# Patient Record
Sex: MALE | Race: BLACK OR AFRICAN AMERICAN | Employment: OTHER | URBAN - METROPOLITAN AREA
[De-identification: names, ages, dates, MRNs, and addresses within clinical notes are randomized per-mention and may not be internally consistent; named-entity substitution may affect disease eponyms.]

---

## 2018-02-08 ENCOUNTER — OP HISTORICAL/CONVERTED ENCOUNTER (OUTPATIENT)
Dept: OTHER | Age: 73
End: 2018-02-08

## 2018-02-15 ENCOUNTER — OP HISTORICAL/CONVERTED ENCOUNTER (OUTPATIENT)
Dept: OTHER | Age: 73
End: 2018-02-15

## 2018-10-12 ENCOUNTER — HOSPITAL ENCOUNTER (OUTPATIENT)
Dept: CT IMAGING | Age: 73
Discharge: HOME | End: 2018-10-12
Payer: MEDICARE

## 2018-10-12 DIAGNOSIS — R91.8 HILAR MASS: ICD-10-CM

## 2018-10-12 PROCEDURE — 71260 CT THORAX DX C+: CPT

## 2018-10-12 RX ORDER — SODIUM CHLORIDE 0.9 % (FLUSH) 0.9 %
10 SYRINGE (ML) INJECTION
Status: COMPLETED | OUTPATIENT
Start: 2018-10-12 | End: 2018-10-12

## 2018-10-12 RX ADMIN — Medication 10 ML: at 10:47

## 2018-10-22 ENCOUNTER — APPOINTMENT (OUTPATIENT)
Dept: GENERAL RADIOLOGY | Age: 73
End: 2018-10-22
Attending: EMERGENCY MEDICINE
Payer: MEDICARE

## 2018-10-22 ENCOUNTER — APPOINTMENT (OUTPATIENT)
Dept: CT IMAGING | Age: 73
End: 2018-10-22
Attending: EMERGENCY MEDICINE
Payer: MEDICARE

## 2018-10-22 ENCOUNTER — HOSPITAL ENCOUNTER (EMERGENCY)
Age: 73
Discharge: HOME | End: 2018-10-22
Attending: EMERGENCY MEDICINE
Payer: MEDICARE

## 2018-10-22 VITALS
HEIGHT: 65 IN | SYSTOLIC BLOOD PRESSURE: 149 MMHG | RESPIRATION RATE: 18 BRPM | TEMPERATURE: 97.4 F | HEART RATE: 73 BPM | DIASTOLIC BLOOD PRESSURE: 85 MMHG | OXYGEN SATURATION: 99 % | WEIGHT: 203.93 LBS | BODY MASS INDEX: 33.98 KG/M2

## 2018-10-22 DIAGNOSIS — R20.2 NUMBNESS AND TINGLING OF RIGHT ARM: ICD-10-CM

## 2018-10-22 DIAGNOSIS — R07.9 CHEST PAIN, UNSPECIFIED TYPE: Primary | ICD-10-CM

## 2018-10-22 DIAGNOSIS — R20.0 NUMBNESS AND TINGLING OF RIGHT ARM: ICD-10-CM

## 2018-10-22 LAB
ALBUMIN SERPL-MCNC: 3.5 G/DL (ref 3.5–5)
ALBUMIN/GLOB SERPL: 1 {RATIO} (ref 1.1–2.2)
ALP SERPL-CCNC: 96 U/L (ref 45–117)
ALT SERPL-CCNC: 24 U/L (ref 12–78)
ANION GAP SERPL CALC-SCNC: 8 MMOL/L (ref 5–15)
APTT PPP: 29.6 SEC (ref 22.1–32)
AST SERPL-CCNC: 21 U/L (ref 15–37)
BASOPHILS # BLD: 0 K/UL (ref 0–0.1)
BASOPHILS NFR BLD: 0 % (ref 0–1)
BILIRUB SERPL-MCNC: 0.3 MG/DL (ref 0.2–1)
BUN SERPL-MCNC: 10 MG/DL (ref 6–20)
BUN/CREAT SERPL: 8 (ref 12–20)
CALCIUM SERPL-MCNC: 8.7 MG/DL (ref 8.5–10.1)
CHLORIDE SERPL-SCNC: 105 MMOL/L (ref 97–108)
CO2 SERPL-SCNC: 27 MMOL/L (ref 21–32)
COMMENT, HOLDF: NORMAL
CREAT SERPL-MCNC: 1.27 MG/DL (ref 0.7–1.3)
D DIMER PPP FEU-MCNC: 0.31 MG/L FEU (ref 0–0.65)
DIFFERENTIAL METHOD BLD: ABNORMAL
EOSINOPHIL # BLD: 0.2 K/UL (ref 0–0.4)
EOSINOPHIL NFR BLD: 2 % (ref 0–7)
ERYTHROCYTE [DISTWIDTH] IN BLOOD BY AUTOMATED COUNT: 16.6 % (ref 11.5–14.5)
GLOBULIN SER CALC-MCNC: 3.6 G/DL (ref 2–4)
GLUCOSE BLD STRIP.AUTO-MCNC: 100 MG/DL (ref 65–100)
GLUCOSE SERPL-MCNC: 102 MG/DL (ref 65–100)
HCT VFR BLD AUTO: 36.9 % (ref 36.6–50.3)
HGB BLD-MCNC: 11.7 G/DL (ref 12.1–17)
IMM GRANULOCYTES # BLD: 0 K/UL (ref 0–0.04)
IMM GRANULOCYTES NFR BLD AUTO: 0 % (ref 0–0.5)
INR PPP: 1 (ref 0.9–1.1)
LIPASE SERPL-CCNC: 223 U/L (ref 73–393)
LYMPHOCYTES # BLD: 2.8 K/UL (ref 0.8–3.5)
LYMPHOCYTES NFR BLD: 28 % (ref 12–49)
MCH RBC QN AUTO: 22.5 PG (ref 26–34)
MCHC RBC AUTO-ENTMCNC: 31.7 G/DL (ref 30–36.5)
MCV RBC AUTO: 70.8 FL (ref 80–99)
MONOCYTES # BLD: 1.1 K/UL (ref 0–1)
MONOCYTES NFR BLD: 11 % (ref 5–13)
NEUTS SEG # BLD: 5.9 K/UL (ref 1.8–8)
NEUTS SEG NFR BLD: 59 % (ref 32–75)
NRBC # BLD: 0 K/UL (ref 0–0.01)
NRBC BLD-RTO: 0 PER 100 WBC
PLATELET # BLD AUTO: 250 K/UL (ref 150–400)
PMV BLD AUTO: 9.3 FL (ref 8.9–12.9)
POTASSIUM SERPL-SCNC: 3.7 MMOL/L (ref 3.5–5.1)
PROT SERPL-MCNC: 7.1 G/DL (ref 6.4–8.2)
PROTHROMBIN TIME: 10 SEC (ref 9–11.1)
RBC # BLD AUTO: 5.21 M/UL (ref 4.1–5.7)
SAMPLES BEING HELD,HOLD: NORMAL
SERVICE CMNT-IMP: NORMAL
SODIUM SERPL-SCNC: 140 MMOL/L (ref 136–145)
THERAPEUTIC RANGE,PTTT: NORMAL SECS (ref 58–77)
TROPONIN I SERPL-MCNC: <0.05 NG/ML
WBC # BLD AUTO: 10 K/UL (ref 4.1–11.1)

## 2018-10-22 PROCEDURE — 93005 ELECTROCARDIOGRAM TRACING: CPT

## 2018-10-22 PROCEDURE — 85610 PROTHROMBIN TIME: CPT | Performed by: EMERGENCY MEDICINE

## 2018-10-22 PROCEDURE — 82962 GLUCOSE BLOOD TEST: CPT

## 2018-10-22 PROCEDURE — 36415 COLL VENOUS BLD VENIPUNCTURE: CPT | Performed by: EMERGENCY MEDICINE

## 2018-10-22 PROCEDURE — 85025 COMPLETE CBC W/AUTO DIFF WBC: CPT | Performed by: EMERGENCY MEDICINE

## 2018-10-22 PROCEDURE — 85379 FIBRIN DEGRADATION QUANT: CPT | Performed by: EMERGENCY MEDICINE

## 2018-10-22 PROCEDURE — 85730 THROMBOPLASTIN TIME PARTIAL: CPT | Performed by: EMERGENCY MEDICINE

## 2018-10-22 PROCEDURE — 84484 ASSAY OF TROPONIN QUANT: CPT | Performed by: EMERGENCY MEDICINE

## 2018-10-22 PROCEDURE — 80053 COMPREHEN METABOLIC PANEL: CPT | Performed by: EMERGENCY MEDICINE

## 2018-10-22 PROCEDURE — 83690 ASSAY OF LIPASE: CPT | Performed by: EMERGENCY MEDICINE

## 2018-10-22 PROCEDURE — 70450 CT HEAD/BRAIN W/O DYE: CPT

## 2018-10-22 PROCEDURE — 99284 EMERGENCY DEPT VISIT MOD MDM: CPT

## 2018-10-22 PROCEDURE — 71046 X-RAY EXAM CHEST 2 VIEWS: CPT

## 2018-10-23 LAB
ATRIAL RATE: 70 BPM
CALCULATED P AXIS, ECG09: 62 DEGREES
CALCULATED R AXIS, ECG10: 64 DEGREES
CALCULATED T AXIS, ECG11: 36 DEGREES
DIAGNOSIS, 93000: NORMAL
P-R INTERVAL, ECG05: 192 MS
Q-T INTERVAL, ECG07: 398 MS
QRS DURATION, ECG06: 80 MS
QTC CALCULATION (BEZET), ECG08: 429 MS
VENTRICULAR RATE, ECG03: 70 BPM

## 2018-10-23 NOTE — ED TRIAGE NOTES
Patient c/o RIGHT arm tingling from elbow to fingertips with pain, around 1pm.  Around 8pm tonight at home felt some chest tightness with bilateral arm tightness. I kept feeling lethargic and tired, dizzy all day.

## 2018-10-23 NOTE — DISCHARGE INSTRUCTIONS

## 2018-10-23 NOTE — ED NOTES
Pt evaluated by Dr Franky Muhammad at [facility], informed to cancel Code S process but to continue with neuro evaluation.

## 2018-10-23 NOTE — ED PROVIDER NOTES
81 y.o. male with no significant past medical history who presents from home via private vehicle with chief complaint of numbness. Pt reports onset earlier today of R arm numbness. Pt states numbness resolved, but reports onset \"2000\" of numbness to bilateral extremities accompanied by dizziness, non radiating mid sternal chest pain, and SOB. Pt reports symptoms lasted \"10 minutes\" and that he currently feels lightheaded. Pt reports recent history of similar symptoms. Pt denies any vision changes, loss of appetite, or trouble swallowing. There are no other acute medical concerns at this time. PCP: Aleja David MD 
 
Note written by Bradley Steward, as dictated by Mimi Martinez MD 9:10 PM 
 
 
 
The history is provided by the patient. No  was used. No past medical history on file. No past surgical history on file. No family history on file. Social History Socioeconomic History  Marital status:  Spouse name: Not on file  Number of children: Not on file  Years of education: Not on file  Highest education level: Not on file Social Needs  Financial resource strain: Not on file  Food insecurity - worry: Not on file  Food insecurity - inability: Not on file  Transportation needs - medical: Not on file  Transportation needs - non-medical: Not on file Occupational History  Not on file Tobacco Use  Smoking status: Not on file Substance and Sexual Activity  Alcohol use: Not on file  Drug use: Not on file  Sexual activity: Not on file Other Topics Concern  Not on file Social History Narrative  Not on file ALLERGIES: Pcn [penicillins] Review of Systems Constitutional: Negative for appetite change, chills and fever. HENT: Negative for trouble swallowing. Eyes: Negative for visual disturbance. Respiratory: Negative for shortness of breath (Resolved). Cardiovascular: Negative for chest pain (Resolved). Gastrointestinal: Negative for diarrhea, nausea and vomiting. Neurological: Positive for light-headedness. Negative for dizziness (Resolved) and numbness (Resolved). All other systems reviewed and are negative. Vitals:  
 10/22/18 2116 10/22/18 2249 BP: 149/85 Pulse: 73 Resp: 18 Temp: 97.4 °F (36.3 °C) SpO2: 97% 99% Weight: 92.5 kg (203 lb 14.8 oz) Height: 5' 5\" (1.651 m) Physical Exam  
Constitutional: He is oriented to person, place, and time. He appears well-developed and well-nourished. No distress. NAD, AxOx4, speaking in complete sentences, GCS = 15 HENT:  
Head: Normocephalic and atraumatic. Right Ear: External ear normal.  
Left Ear: External ear normal.  
Nose: Nose normal.  
Mouth/Throat: Oropharynx is clear and moist. No oropharyngeal exudate. Cn intact No facial droop/ slurred speech/ tongue deviation Eyes: Conjunctivae and EOM are normal. Pupils are equal, round, and reactive to light. Right eye exhibits no discharge. Left eye exhibits no discharge. No scleral icterus. Neck: Normal range of motion. Neck supple. No JVD present. No tracheal deviation present. No thyromegaly present. Cardiovascular: Normal rate, regular rhythm, normal heart sounds and intact distal pulses. Exam reveals no gallop and no friction rub. No murmur heard. Pulmonary/Chest: Effort normal and breath sounds normal. No respiratory distress. He has no wheezes. He has no rales. He exhibits no tenderness. Abdominal: Soft. Bowel sounds are normal. He exhibits no distension and no mass. There is no tenderness. There is no rebound and no guarding. nttp Genitourinary:  
Genitourinary Comments: Pt denies urinary/ Testicular/ scrotal or penile  complaints Musculoskeletal: Normal range of motion. He exhibits no edema. Lymphadenopathy:  
  He has no cervical adenopathy. Neurological: He is alert and oriented to person, place, and time. No cranial nerve deficit. Coordination normal.  
pt has motor/ CV/ Sensation grossly intact to all extremities, R = L in strength; 
 
 R = L; ambulating w/ ease Skin: Skin is warm and dry. No rash noted. No erythema. Psychiatric: He has a normal mood and affect. Nursing note and vitals reviewed. MDM Procedures Chief Complaint Patient presents with  Numbness 10:04 PM 
The patients presenting problems have been discussed, and they are in agreement with the care plan formulated and outlined with them. I have encouraged them to ask questions as they arise throughout their visit. MEDICATIONS GIVEN: 
Medications - No data to display LABS REVIEWED: 
Labs Reviewed CBC WITH AUTOMATED DIFF PROTHROMBIN TIME + INR  
PTT  
SAMPLES BEING HELD METABOLIC PANEL, COMPREHENSIVE  
TROPONIN I  
D DIMER  
LIPASE  
GLUCOSE, POC  
 
 
RADIOLOGY RESULTS: 
The following have been ordered and reviewed: 
_____________________________________________________________________ 
_____________________________________________________________________ EKG interpretation:  
Rhythm: normal sinus rhythm; and ir-regular due to a PVC  . Rate (approx.): 70; Axis: normal; P wave: normal; QRS interval: normal ; ST/T wave: normal; Negative acute significant segmental elevations/ no old study;  
 
PROCEDURES: 
 
 
 
CONSULTATIONS:  
 
 
PROGRESS NOTES: 
 
DIAGNOSIS: 
 
1. Chest pain, unspecified type 2. Numbness and tingling of right arm PLAN: 
1- Chest Pain / R arm numbness - neg ed evaluation - will have pt follow-up with Cardiology;  
 
 
ED COURSE: The patients hospital course has been uncomplicated. PROGRESS NOTE: 
9:11 PM 
Discussed possible etiology/ Favor cardiac > TIA; Pt agrees; Code S cancelled.   
 
10:05 PM 
'feeling better'; awaiting results;  
  
11:13 PM 
 Jose Roberto Lema's  results have been reviewed with him. He has been counseled regarding his diagnosis. He verbally conveys understanding and agreement of the signs, symptoms, diagnosis, treatment and prognosis and additionally agrees to Call/ Arrange follow up as recommended [facility] - 48 hours. He also agrees with the care-plan and conveys that all of his questions have been answered. I have also put together some discharge instructions for him that include: 1) educational information regarding their diagnosis, 2) how to care for their diagnosis at home, as well a 3) list of reasons why they would want to return to the ED prior to their follow-up appointment, should their condition change or for concerns.

## 2018-11-05 ENCOUNTER — HOSPITAL ENCOUNTER (OUTPATIENT)
Dept: MRI IMAGING | Age: 73
Discharge: HOME | End: 2018-11-05
Attending: FAMILY MEDICINE
Payer: MEDICARE

## 2018-11-05 DIAGNOSIS — R16.0 LIVER MASSES: ICD-10-CM

## 2018-11-05 PROCEDURE — 74183 MRI ABD W/O CNTR FLWD CNTR: CPT

## 2018-11-05 PROCEDURE — 74011250636 HC RX REV CODE- 250/636: Performed by: RADIOLOGY

## 2018-11-05 PROCEDURE — A9585 GADOBUTROL INJECTION: HCPCS | Performed by: RADIOLOGY

## 2018-11-05 RX ADMIN — GADOBUTROL 10 ML: 604.72 INJECTION INTRAVENOUS at 19:26

## 2018-11-13 ENCOUNTER — OFFICE VISIT (OUTPATIENT)
Dept: CARDIOLOGY CLINIC | Age: 73
End: 2018-11-13

## 2018-11-13 VITALS
WEIGHT: 208 LBS | HEART RATE: 84 BPM | OXYGEN SATURATION: 98 % | HEIGHT: 65 IN | BODY MASS INDEX: 34.66 KG/M2 | RESPIRATION RATE: 12 BRPM | DIASTOLIC BLOOD PRESSURE: 78 MMHG | SYSTOLIC BLOOD PRESSURE: 130 MMHG

## 2018-11-13 DIAGNOSIS — E78.2 MIXED HYPERLIPIDEMIA: ICD-10-CM

## 2018-11-13 DIAGNOSIS — R07.89 OTHER CHEST PAIN: Primary | ICD-10-CM

## 2018-11-13 DIAGNOSIS — I49.3 PVC'S (PREMATURE VENTRICULAR CONTRACTIONS): ICD-10-CM

## 2018-11-13 DIAGNOSIS — R06.02 SOB (SHORTNESS OF BREATH): ICD-10-CM

## 2018-11-13 RX ORDER — TRAZODONE HYDROCHLORIDE 100 MG/1
100 TABLET ORAL
COMMUNITY

## 2018-11-13 RX ORDER — OMEPRAZOLE 10 MG/1
10 CAPSULE, DELAYED RELEASE ORAL DAILY
COMMUNITY

## 2018-11-13 RX ORDER — CETIRIZINE HCL 10 MG
TABLET ORAL
COMMUNITY

## 2018-11-13 NOTE — PROGRESS NOTES
HISTORY OF PRESENT ILLNESS Genesis Ni is a 72 y.o. male SUMMARY:  
Problem List  Date Reviewed: 11/13/2018 None Current Outpatient Medications on File Prior to Visit Medication Sig  
 traZODone (DESYREL) 100 mg tablet Take 100 mg by mouth nightly.  omeprazole (PRILOSEC) 10 mg capsule Take 10 mg by mouth daily.  cetirizine (ZYRTEC) 10 mg tablet Take  by mouth. No current facility-administered medications on file prior to visit. CARDIOLOGY STUDIES TO DATE: 
none Chief Complaint Patient presents with  Chest Pain HPI : 
Mr. Sami Baeza is a 72year-old referred by the [facility] ER for cardiac evaluation. He has had a couple of episodes over the last few years with lower chest and epigastric discomfort with somewhat pleuritic quality to it, lightheadedness, transient right arm pain lasting five minutes or so without any other associated symptoms. He had a bad episode in late October and ended up in the emergency room at [facility]. His laboratory evaluation was negative for cardiac issues. His EKG showed sinus rhythm with PVCs. Up until about a month ago, he had been exercising regularly on a treadmill for about 30 minutes plus doing light weights without any symptoms suggestive of angina or heart failure. He has mild dyspnea on exertion, which he has had for years, sometimes with stairs. There is no history of hypertension or diabetes. Family history is negative for premature coronary disease. He has an elevated cholesterol, but is intolerant of statin drugs and quit smoking in 1975. Some time in his late 46s, he had a cardiac catheterization at [facility] and was told he had no blockages. He does not know why he had that heart catheterization at that time. He has some sleep difficulties from time to time and apparently he has a thyroid problem, maybe some nodules and is in the process of being evaluated for that.    
 
 
 
CARDIAC ROS:  
 negative for palpitations, syncope, orthopnea, paroxysmal nocturnal dyspnea, exertional chest pressure/discomfort, claudication, lower extremity edema Family History Problem Relation Age of Onset  Cancer Mother  Cancer Father  Heart Disease Neg Hx Past Medical History:  
Diagnosis Date  Back pain Lumbar  Liver lesion GENERAL ROS: 
A comprehensive review of systems was negative except for that written in the HPI. Visit Vitals /78 (BP 1 Location: Left arm, BP Patient Position: Sitting) Pulse 84 Resp 12 Ht 5' 5\" (1.651 m) Wt 208 lb (94.3 kg) SpO2 98% BMI 34.61 kg/m² Wt Readings from Last 3 Encounters:  
11/13/18 208 lb (94.3 kg) 10/22/18 203 lb 14.8 oz (92.5 kg) BP Readings from Last 3 Encounters:  
11/13/18 130/78  
10/22/18 149/85 PHYSICAL EXAM 
General appearance: alert, cooperative, no distress, appears stated age Neurologic: Alert and oriented X 3 Neck: supple, symmetrical, trachea midline, no adenopathy, no carotid bruit and no JVD Lungs: clear to auscultation bilaterally Heart: regular rate and rhythm, S1, S2 normal, no murmur, click, rub or gallop Abdomen: soft, non-tender. Bowel sounds normal. No masses,  no organomegaly Extremities: extremities normal, atraumatic, no cyanosis or edema Pulses: 2+ and symmetric ASSESSMENT Mr. Mickie Harry symptoms are somewhat atypical, but he does have important risk factors and he wants to continue to exercise, so for all these reasons I think he needs a stress echocardiogram and we will make arrangements to get that done in the near future. We talked at length about symptoms that would prompt an urgent return visit here or a call to 911. current treatment plan is effective, no change in therapy 
lab results and schedule of future lab studies reviewed with patient 
reviewed diet, exercise and weight control Encounter Diagnoses Name Primary?  Other chest pain Yes  PVC's (premature ventricular contractions)  SOB (shortness of breath)  Mixed hyperlipidemia Orders Placed This Encounter  ECHO TTE STRESS COMP W OR WO CONTR  
 traZODone (DESYREL) 100 mg tablet  omeprazole (PRILOSEC) 10 mg capsule  cetirizine (ZYRTEC) 10 mg tablet Follow-up Disposition: 
Return in about 4 weeks (around 12/11/2018). Sam Mtz MD11/13/2018

## 2018-11-13 NOTE — PROGRESS NOTES
1. Have you been to the ER, urgent care clinic since your last visit? Hospitalized since your last visit? Yes When: 10/22/2018 Where: [facility] Reason for visit: Chest pain, Numbness 2. Have you seen or consulted any other health care providers outside of the [address] [place] [address] since your last visit? Include any pap smears or colon screening. No  
 
Pt reports Med Rec. Completed. Pt reports taking Multivitamin Visit Vitals /78 (BP 1 Location: Left arm, BP Patient Position: Sitting) Pulse 84 Resp 12 Ht 5' 5\" (1.651 m) Wt 208 lb (94.3 kg) SpO2 98% BMI 34.61 kg/m²

## 2018-11-15 ENCOUNTER — HOSPITAL ENCOUNTER (OUTPATIENT)
Dept: ULTRASOUND IMAGING | Age: 73
Discharge: HOME | End: 2018-11-15
Attending: OTOLARYNGOLOGY
Payer: MEDICARE

## 2018-11-15 DIAGNOSIS — E04.1 NONTOXIC SINGLE THYROID NODULE: ICD-10-CM

## 2018-11-15 PROCEDURE — 76536 US EXAM OF HEAD AND NECK: CPT

## 2018-11-16 ENCOUNTER — CLINICAL SUPPORT (OUTPATIENT)
Dept: CARDIOLOGY CLINIC | Age: 73
End: 2018-11-16

## 2018-11-16 DIAGNOSIS — R07.89 OTHER CHEST PAIN: ICD-10-CM

## 2018-12-03 ENCOUNTER — HOSPITAL ENCOUNTER (OUTPATIENT)
Dept: CT IMAGING | Age: 73
Discharge: HOME | End: 2018-12-03
Attending: INTERNAL MEDICINE
Payer: MEDICARE

## 2018-12-03 ENCOUNTER — HOSPITAL ENCOUNTER (OUTPATIENT)
Dept: CT IMAGING | Age: 73
Discharge: HOME | End: 2018-12-03
Attending: RADIOLOGY
Payer: MEDICARE

## 2018-12-03 VITALS
OXYGEN SATURATION: 98 % | WEIGHT: 198 LBS | HEIGHT: 64 IN | SYSTOLIC BLOOD PRESSURE: 120 MMHG | DIASTOLIC BLOOD PRESSURE: 60 MMHG | BODY MASS INDEX: 33.8 KG/M2 | TEMPERATURE: 98.6 F | RESPIRATION RATE: 20 BRPM | HEART RATE: 76 BPM

## 2018-12-03 DIAGNOSIS — R16.0 HEPATOMEGALY: ICD-10-CM

## 2018-12-03 PROCEDURE — 88341 IMHCHEM/IMCYTCHM EA ADD ANTB: CPT

## 2018-12-03 PROCEDURE — 77030018781

## 2018-12-03 PROCEDURE — 88333 PATH CONSLTJ SURG CYTO XM 1: CPT

## 2018-12-03 PROCEDURE — 77030020268 HC MISC GENERAL SUPPLY

## 2018-12-03 PROCEDURE — 74150 CT ABDOMEN W/O CONTRAST: CPT

## 2018-12-03 PROCEDURE — 88307 TISSUE EXAM BY PATHOLOGIST: CPT

## 2018-12-03 PROCEDURE — 77030003666 HC NDL SPINAL BD -A

## 2018-12-03 PROCEDURE — 88342 IMHCHEM/IMCYTCHM 1ST ANTB: CPT

## 2018-12-03 PROCEDURE — 77030014115

## 2018-12-03 PROCEDURE — 74011250636 HC RX REV CODE- 250/636: Performed by: RADIOLOGY

## 2018-12-03 PROCEDURE — 74011250637 HC RX REV CODE- 250/637: Performed by: RADIOLOGY

## 2018-12-03 PROCEDURE — 99152 MOD SED SAME PHYS/QHP 5/>YRS: CPT

## 2018-12-03 RX ORDER — MIDAZOLAM HYDROCHLORIDE 1 MG/ML
5 INJECTION, SOLUTION INTRAMUSCULAR; INTRAVENOUS
Status: DISCONTINUED | OUTPATIENT
Start: 2018-12-03 | End: 2018-12-07 | Stop reason: HOSPADM

## 2018-12-03 RX ORDER — LIDOCAINE HYDROCHLORIDE 10 MG/ML
5 INJECTION, SOLUTION EPIDURAL; INFILTRATION; INTRACAUDAL; PERINEURAL
Status: ACTIVE | OUTPATIENT
Start: 2018-12-03 | End: 2018-12-03

## 2018-12-03 RX ORDER — OXYCODONE AND ACETAMINOPHEN 5; 325 MG/1; MG/1
2 TABLET ORAL ONCE
Status: COMPLETED | OUTPATIENT
Start: 2018-12-03 | End: 2018-12-03

## 2018-12-03 RX ORDER — ASPIRIN 81 MG/1
TABLET ORAL DAILY
COMMUNITY
End: 2018-12-07

## 2018-12-03 RX ORDER — SODIUM CHLORIDE 9 MG/ML
25 INJECTION, SOLUTION INTRAVENOUS CONTINUOUS
Status: DISCONTINUED | OUTPATIENT
Start: 2018-12-03 | End: 2018-12-07 | Stop reason: HOSPADM

## 2018-12-03 RX ORDER — FENTANYL CITRATE 50 UG/ML
100 INJECTION, SOLUTION INTRAMUSCULAR; INTRAVENOUS
Status: DISCONTINUED | OUTPATIENT
Start: 2018-12-03 | End: 2018-12-07 | Stop reason: HOSPADM

## 2018-12-03 RX ADMIN — FENTANYL CITRATE 25 MCG: 50 INJECTION, SOLUTION INTRAMUSCULAR; INTRAVENOUS at 10:58

## 2018-12-03 RX ADMIN — MIDAZOLAM HYDROCHLORIDE 1 MG: 1 INJECTION, SOLUTION INTRAMUSCULAR; INTRAVENOUS at 10:42

## 2018-12-03 RX ADMIN — SODIUM CHLORIDE 25 ML/HR: 900 INJECTION, SOLUTION INTRAVENOUS at 10:21

## 2018-12-03 RX ADMIN — FENTANYL CITRATE 50 MCG: 50 INJECTION, SOLUTION INTRAMUSCULAR; INTRAVENOUS at 12:58

## 2018-12-03 RX ADMIN — FENTANYL CITRATE 25 MCG: 50 INJECTION, SOLUTION INTRAMUSCULAR; INTRAVENOUS at 10:24

## 2018-12-03 RX ADMIN — FENTANYL CITRATE 50 MCG: 50 INJECTION, SOLUTION INTRAMUSCULAR; INTRAVENOUS at 14:00

## 2018-12-03 RX ADMIN — MIDAZOLAM HYDROCHLORIDE 1 MG: 1 INJECTION, SOLUTION INTRAMUSCULAR; INTRAVENOUS at 10:24

## 2018-12-03 RX ADMIN — FENTANYL CITRATE 25 MCG: 50 INJECTION, SOLUTION INTRAMUSCULAR; INTRAVENOUS at 10:33

## 2018-12-03 RX ADMIN — FENTANYL CITRATE 25 MCG: 50 INJECTION, SOLUTION INTRAMUSCULAR; INTRAVENOUS at 11:02

## 2018-12-03 RX ADMIN — MIDAZOLAM HYDROCHLORIDE 1 MG: 1 INJECTION, SOLUTION INTRAMUSCULAR; INTRAVENOUS at 10:59

## 2018-12-03 RX ADMIN — OXYCODONE AND ACETAMINOPHEN 2 TABLET: 5; 325 TABLET ORAL at 12:55

## 2018-12-03 NOTE — PROGRESS NOTES
Fentanyl 50 mcg given slow iv push for complaint of pain to right upper abdomen. Dr. Jason Castorena at bedside speaking to patoent. Continue to monitor for pain.

## 2018-12-03 NOTE — H&P
Radiology History and Physical    Patient: Adalgisa Altamirano 72 y.o. male       Chief Complaint: No chief complaint on file. History of Present Illness: ct guided liver mass biopsy2    History:    Past Medical History:   Diagnosis Date    Back pain     Lumbar    Liver lesion      Family History   Problem Relation Age of Onset    Cancer Mother     Cancer Father     Heart Disease Neg Hx      Social History     Socioeconomic History    Marital status:      Spouse name: Not on file    Number of children: Not on file    Years of education: Not on file    Highest education level: Not on file   Social Needs    Financial resource strain: Not on file    Food insecurity - worry: Not on file    Food insecurity - inability: Not on file   [place] needs - medical: Not on file   [place] needs - non-medical: Not on file   Occupational History    Not on file   Tobacco Use    Smoking status: Former Smoker    Smokeless tobacco: Never Used   Substance and Sexual Activity    Alcohol use: Not on file    Drug use: Not on file    Sexual activity: Not on file   Other Topics Concern    Not on file   Social History Narrative    Not on file       Allergies: Allergies   Allergen Reactions    Pcn [Penicillins] Rash       Current Medications:  Current Outpatient Medications   Medication Sig    aspirin delayed-release 81 mg tablet Take  by mouth daily.  traZODone (DESYREL) 100 mg tablet Take 100 mg by mouth nightly.  omeprazole (PRILOSEC) 10 mg capsule Take 10 mg by mouth daily.  cetirizine (ZYRTEC) 10 mg tablet Take  by mouth.      Current Facility-Administered Medications   Medication Dose Route Frequency    lidocaine (PF) (XYLOCAINE) 10 mg/mL (1 %) injection 5 mL  5 mL SubCUTAneous RAD ONCE    fentaNYL citrate (PF) injection 100 mcg  100 mcg IntraVENous Multiple    midazolam (VERSED) injection 5 mg  5 mg IntraVENous Multiple    0.9% sodium chloride infusion  25 mL/hr IntraVENous CONTINUOUS        Physical Exam:  Blood pressure 120/60, pulse 76, temperature 98.6 °F (37 °C), resp. rate 20, height 5' 4\" (1.626 m), weight 89.8 kg (198 lb), SpO2 98 %. LUNG: clear to auscultation bilaterally, HEART: regular rate and rhythm      Alerts:    Hospital Problems  Date Reviewed: 11/13/2018    None          Laboratory:    No results for input(s): HGB, HCT, WBC, PLT, INR, BUN, CREA, K, CRCLT, HGBEXT, HCTEXT, PLTEXT in the last 72 hours. No lab exists for component: PTT, PT, INREXT      Plan of Care/Planned Procedure:  Risks, benefits, and alternatives reviewed with patient and he agrees to proceed with the procedure.        Elton Romeo MD

## 2018-12-03 NOTE — PROGRESS NOTES
Pain has not resided at this time. 50 mcg of fentanyl given slow iv push. Respirations even and unlabored.

## 2018-12-03 NOTE — PROGRESS NOTES
Discharge instructions reviewed with patient. Patient verbalizes understanding. Discharged via wheelchair in stable condition. Patient released with family member via wheelchair. Denies pain.

## 2018-12-03 NOTE — DISCHARGE INSTRUCTIONS
[address]  Special Procedures/Radiology Department    Radiologist:  Dr. Jean Paul Zepeda    Date:12/3/2018    Liver Biopsy Discharge Instructions    You may have an aching pain in the biopsy site tonight. Take Tylenol, as directed on the label, for pain or discomfort. Avoid ibuprofen (Advil, Motrin) and aspirin for the next 48 hours as these drugs may cause you to bleed. Resume your previous diet and follow the medication reconciliation form. Rest today. Do not drive or sign any legal documents activity for 24 hours because you received sedation medications. Avoid any strenuous activity for 24 hours. Do not lift anything heavier than a small grocery bag (10 pounds) and avoid twisting for the next 5 days. If you experience severe sweating, severe abdominal pain, dizziness or faintness, go to the nearest Emergency Room immediately. Pain under the left collar bone is normal.    Watch for signs of infection at biopsy site:  redness, pain, drainage, fever chills. If this occurs, call you doctor. Contact your physician after 5 business days for test results. If you have any questions or concerns, please call [phone] and ask to speak to the nurse on-call.

## 2018-12-03 NOTE — ROUTINE PROCESS
Procedure reviewed with patient by Dr. Chris Kern. Opportunity to verbalize questions and concerns. Consent obtained.

## 2018-12-04 ENCOUNTER — HOSPITAL ENCOUNTER (INPATIENT)
Age: 73
LOS: 3 days | Discharge: HOME | DRG: 442 | End: 2018-12-07
Attending: EMERGENCY MEDICINE | Admitting: FAMILY MEDICINE
Payer: MEDICARE

## 2018-12-04 ENCOUNTER — APPOINTMENT (OUTPATIENT)
Dept: CT IMAGING | Age: 73
DRG: 442 | End: 2018-12-04
Attending: PHYSICIAN ASSISTANT
Payer: MEDICARE

## 2018-12-04 DIAGNOSIS — R10.11 RUQ ABDOMINAL PAIN: Primary | ICD-10-CM

## 2018-12-04 DIAGNOSIS — K76.89 SUBCAPSULAR HEPATIC HEMATOMA: ICD-10-CM

## 2018-12-04 LAB
ALBUMIN SERPL-MCNC: 3.7 G/DL (ref 3.5–5)
ALBUMIN/GLOB SERPL: 1 {RATIO} (ref 1.1–2.2)
ALP SERPL-CCNC: 106 U/L (ref 45–117)
ALT SERPL-CCNC: 131 U/L (ref 12–78)
ANION GAP SERPL CALC-SCNC: 5 MMOL/L (ref 5–15)
APPEARANCE UR: CLEAR
APTT PPP: 26.2 SEC (ref 22.1–32)
AST SERPL-CCNC: 97 U/L (ref 15–37)
BACTERIA URNS QL MICRO: NEGATIVE /HPF
BASOPHILS # BLD: 0 K/UL (ref 0–0.1)
BASOPHILS NFR BLD: 0 % (ref 0–1)
BILIRUB SERPL-MCNC: 0.3 MG/DL (ref 0.2–1)
BILIRUB UR QL: NEGATIVE
BUN SERPL-MCNC: 8 MG/DL (ref 6–20)
BUN/CREAT SERPL: 7 (ref 12–20)
CALCIUM SERPL-MCNC: 8.5 MG/DL (ref 8.5–10.1)
CHLORIDE SERPL-SCNC: 105 MMOL/L (ref 97–108)
CO2 SERPL-SCNC: 28 MMOL/L (ref 21–32)
COLOR UR: NORMAL
COMMENT, HOLDF: NORMAL
CREAT SERPL-MCNC: 1.14 MG/DL (ref 0.7–1.3)
DIFFERENTIAL METHOD BLD: ABNORMAL
EOSINOPHIL # BLD: 0.1 K/UL (ref 0–0.4)
EOSINOPHIL NFR BLD: 1 % (ref 0–7)
EPITH CASTS URNS QL MICRO: NORMAL /LPF
ERYTHROCYTE [DISTWIDTH] IN BLOOD BY AUTOMATED COUNT: 16.1 % (ref 11.5–14.5)
GLOBULIN SER CALC-MCNC: 3.8 G/DL (ref 2–4)
GLUCOSE SERPL-MCNC: 126 MG/DL (ref 65–100)
GLUCOSE UR STRIP.AUTO-MCNC: NEGATIVE MG/DL
HCT VFR BLD AUTO: 34.3 % (ref 36.6–50.3)
HGB BLD-MCNC: 10.7 G/DL (ref 12.1–17)
HGB UR QL STRIP: NEGATIVE
HYALINE CASTS URNS QL MICRO: NORMAL /LPF (ref 0–5)
IMM GRANULOCYTES # BLD: 0 K/UL (ref 0–0.04)
IMM GRANULOCYTES NFR BLD AUTO: 0 % (ref 0–0.5)
INR PPP: 1 (ref 0.9–1.1)
KETONES UR QL STRIP.AUTO: NEGATIVE MG/DL
LEUKOCYTE ESTERASE UR QL STRIP.AUTO: NEGATIVE
LYMPHOCYTES # BLD: 1.6 K/UL (ref 0.8–3.5)
LYMPHOCYTES NFR BLD: 13 % (ref 12–49)
MCH RBC QN AUTO: 22 PG (ref 26–34)
MCHC RBC AUTO-ENTMCNC: 31.2 G/DL (ref 30–36.5)
MCV RBC AUTO: 70.6 FL (ref 80–99)
MONOCYTES # BLD: 1.4 K/UL (ref 0–1)
MONOCYTES NFR BLD: 11 % (ref 5–13)
NEUTS SEG # BLD: 9.5 K/UL (ref 1.8–8)
NEUTS SEG NFR BLD: 75 % (ref 32–75)
NITRITE UR QL STRIP.AUTO: NEGATIVE
NRBC # BLD: 0 K/UL (ref 0–0.01)
NRBC BLD-RTO: 0 PER 100 WBC
PH UR STRIP: 6 [PH] (ref 5–8)
PLATELET # BLD AUTO: 231 K/UL (ref 150–400)
PMV BLD AUTO: 10.3 FL (ref 8.9–12.9)
POTASSIUM SERPL-SCNC: 4.3 MMOL/L (ref 3.5–5.1)
PROT SERPL-MCNC: 7.5 G/DL (ref 6.4–8.2)
PROT UR STRIP-MCNC: NEGATIVE MG/DL
PROTHROMBIN TIME: 10.3 SEC (ref 9–11.1)
RBC # BLD AUTO: 4.86 M/UL (ref 4.1–5.7)
RBC #/AREA URNS HPF: NORMAL /HPF (ref 0–5)
SAMPLES BEING HELD,HOLD: NORMAL
SODIUM SERPL-SCNC: 138 MMOL/L (ref 136–145)
SP GR UR REFRACTOMETRY: 1.01 (ref 1–1.03)
THERAPEUTIC RANGE,PTTT: NORMAL SECS (ref 58–77)
UR CULT HOLD, URHOLD: NORMAL
UROBILINOGEN UR QL STRIP.AUTO: 0.2 EU/DL (ref 0.2–1)
WBC # BLD AUTO: 12.6 K/UL (ref 4.1–11.1)
WBC URNS QL MICRO: NORMAL /HPF (ref 0–4)

## 2018-12-04 PROCEDURE — 74011000258 HC RX REV CODE- 258: Performed by: RADIOLOGY

## 2018-12-04 PROCEDURE — 74011636320 HC RX REV CODE- 636/320: Performed by: RADIOLOGY

## 2018-12-04 PROCEDURE — 74177 CT ABD & PELVIS W/CONTRAST: CPT

## 2018-12-04 PROCEDURE — 96374 THER/PROPH/DIAG INJ IV PUSH: CPT

## 2018-12-04 PROCEDURE — 65610000006 HC RM INTENSIVE CARE

## 2018-12-04 PROCEDURE — 36415 COLL VENOUS BLD VENIPUNCTURE: CPT

## 2018-12-04 PROCEDURE — 86901 BLOOD TYPING SEROLOGIC RH(D): CPT

## 2018-12-04 PROCEDURE — 99285 EMERGENCY DEPT VISIT HI MDM: CPT

## 2018-12-04 PROCEDURE — 74011250636 HC RX REV CODE- 250/636: Performed by: EMERGENCY MEDICINE

## 2018-12-04 PROCEDURE — 85610 PROTHROMBIN TIME: CPT

## 2018-12-04 PROCEDURE — 80053 COMPREHEN METABOLIC PANEL: CPT

## 2018-12-04 PROCEDURE — 81001 URINALYSIS AUTO W/SCOPE: CPT

## 2018-12-04 PROCEDURE — 85730 THROMBOPLASTIN TIME PARTIAL: CPT

## 2018-12-04 PROCEDURE — 85025 COMPLETE CBC W/AUTO DIFF WBC: CPT

## 2018-12-04 RX ORDER — SODIUM CHLORIDE 0.9 % (FLUSH) 0.9 %
10 SYRINGE (ML) INJECTION
Status: COMPLETED | OUTPATIENT
Start: 2018-12-04 | End: 2018-12-04

## 2018-12-04 RX ORDER — HYDROMORPHONE HYDROCHLORIDE 2 MG/ML
1 INJECTION, SOLUTION INTRAMUSCULAR; INTRAVENOUS; SUBCUTANEOUS ONCE
Status: COMPLETED | OUTPATIENT
Start: 2018-12-04 | End: 2018-12-04

## 2018-12-04 RX ORDER — SODIUM CHLORIDE 0.9 % (FLUSH) 0.9 %
5-10 SYRINGE (ML) INJECTION AS NEEDED
Status: DISCONTINUED | OUTPATIENT
Start: 2018-12-04 | End: 2018-12-07 | Stop reason: HOSPADM

## 2018-12-04 RX ORDER — SODIUM CHLORIDE 0.9 % (FLUSH) 0.9 %
5-10 SYRINGE (ML) INJECTION EVERY 8 HOURS
Status: DISCONTINUED | OUTPATIENT
Start: 2018-12-04 | End: 2018-12-07 | Stop reason: HOSPADM

## 2018-12-04 RX ORDER — SODIUM CHLORIDE 9 MG/ML
100 INJECTION, SOLUTION INTRAVENOUS CONTINUOUS
Status: DISCONTINUED | OUTPATIENT
Start: 2018-12-05 | End: 2018-12-06

## 2018-12-04 RX ORDER — ONDANSETRON 2 MG/ML
4 INJECTION INTRAMUSCULAR; INTRAVENOUS
Status: DISCONTINUED | OUTPATIENT
Start: 2018-12-04 | End: 2018-12-07 | Stop reason: HOSPADM

## 2018-12-04 RX ADMIN — IOPAMIDOL 100 ML: 755 INJECTION, SOLUTION INTRAVENOUS at 20:34

## 2018-12-04 RX ADMIN — Medication 10 ML: at 20:34

## 2018-12-04 RX ADMIN — HYDROMORPHONE HYDROCHLORIDE 1 MG: 2 INJECTION INTRAMUSCULAR; INTRAVENOUS; SUBCUTANEOUS at 21:30

## 2018-12-04 RX ADMIN — SODIUM CHLORIDE 100 ML: 900 INJECTION, SOLUTION INTRAVENOUS at 20:34

## 2018-12-04 NOTE — ED TRIAGE NOTES
Had a liver biopsy done yesterday. Having severe pain right upper abdomen today which takes his breath away.

## 2018-12-05 ENCOUNTER — APPOINTMENT (OUTPATIENT)
Dept: GENERAL RADIOLOGY | Age: 73
DRG: 442 | End: 2018-12-05
Attending: FAMILY MEDICINE
Payer: MEDICARE

## 2018-12-05 LAB
ABO + RH BLD: NORMAL
ALBUMIN SERPL-MCNC: 3.5 G/DL (ref 3.5–5)
ALBUMIN/GLOB SERPL: 0.9 {RATIO} (ref 1.1–2.2)
ALP SERPL-CCNC: 95 U/L (ref 45–117)
ALT SERPL-CCNC: 112 U/L (ref 12–78)
ANION GAP SERPL CALC-SCNC: 5 MMOL/L (ref 5–15)
AST SERPL-CCNC: 70 U/L (ref 15–37)
ATRIAL RATE: 81 BPM
BASOPHILS # BLD: 0 K/UL (ref 0–0.1)
BASOPHILS NFR BLD: 0 % (ref 0–1)
BILIRUB DIRECT SERPL-MCNC: 0.2 MG/DL (ref 0–0.2)
BILIRUB SERPL-MCNC: 0.8 MG/DL (ref 0.2–1)
BLOOD GROUP ANTIBODIES SERPL: NORMAL
BUN SERPL-MCNC: 8 MG/DL (ref 6–20)
BUN/CREAT SERPL: 8 (ref 12–20)
CALCIUM SERPL-MCNC: 8.1 MG/DL (ref 8.5–10.1)
CALCULATED P AXIS, ECG09: 57 DEGREES
CALCULATED R AXIS, ECG10: 75 DEGREES
CALCULATED T AXIS, ECG11: -24 DEGREES
CHLORIDE SERPL-SCNC: 105 MMOL/L (ref 97–108)
CO2 SERPL-SCNC: 29 MMOL/L (ref 21–32)
CREAT SERPL-MCNC: 0.99 MG/DL (ref 0.7–1.3)
DIAGNOSIS, 93000: NORMAL
DIFFERENTIAL METHOD BLD: ABNORMAL
EOSINOPHIL # BLD: 0.1 K/UL (ref 0–0.4)
EOSINOPHIL NFR BLD: 0 % (ref 0–7)
ERYTHROCYTE [DISTWIDTH] IN BLOOD BY AUTOMATED COUNT: 16.1 % (ref 11.5–14.5)
GLOBULIN SER CALC-MCNC: 3.9 G/DL (ref 2–4)
GLUCOSE SERPL-MCNC: 114 MG/DL (ref 65–100)
HCT VFR BLD AUTO: 32.1 % (ref 36.6–50.3)
HCT VFR BLD AUTO: 33.9 % (ref 36.6–50.3)
HGB BLD-MCNC: 10.3 G/DL (ref 12.1–17)
HGB BLD-MCNC: 9.8 G/DL (ref 12.1–17)
IMM GRANULOCYTES # BLD: 0.1 K/UL (ref 0–0.04)
IMM GRANULOCYTES NFR BLD AUTO: 1 % (ref 0–0.5)
LACTATE SERPL-SCNC: 0.8 MMOL/L (ref 0.4–2)
LYMPHOCYTES # BLD: 1.7 K/UL (ref 0.8–3.5)
LYMPHOCYTES NFR BLD: 11 % (ref 12–49)
MCH RBC QN AUTO: 21.6 PG (ref 26–34)
MCHC RBC AUTO-ENTMCNC: 30.5 G/DL (ref 30–36.5)
MCV RBC AUTO: 70.9 FL (ref 80–99)
MONOCYTES # BLD: 2 K/UL (ref 0–1)
MONOCYTES NFR BLD: 13 % (ref 5–13)
NEUTS SEG # BLD: 11.1 K/UL (ref 1.8–8)
NEUTS SEG NFR BLD: 74 % (ref 32–75)
NRBC # BLD: 0 K/UL (ref 0–0.01)
NRBC BLD-RTO: 0 PER 100 WBC
P-R INTERVAL, ECG05: 176 MS
PLATELET # BLD AUTO: 211 K/UL (ref 150–400)
PMV BLD AUTO: 9.6 FL (ref 8.9–12.9)
POTASSIUM SERPL-SCNC: 4.1 MMOL/L (ref 3.5–5.1)
PROT SERPL-MCNC: 7.4 G/DL (ref 6.4–8.2)
Q-T INTERVAL, ECG07: 340 MS
QRS DURATION, ECG06: 86 MS
QTC CALCULATION (BEZET), ECG08: 394 MS
RBC # BLD AUTO: 4.53 M/UL (ref 4.1–5.7)
SODIUM SERPL-SCNC: 139 MMOL/L (ref 136–145)
SPECIMEN EXP DATE BLD: NORMAL
VENTRICULAR RATE, ECG03: 81 BPM
WBC # BLD AUTO: 15 K/UL (ref 4.1–11.1)

## 2018-12-05 PROCEDURE — 85025 COMPLETE CBC W/AUTO DIFF WBC: CPT

## 2018-12-05 PROCEDURE — 36415 COLL VENOUS BLD VENIPUNCTURE: CPT

## 2018-12-05 PROCEDURE — 74011250636 HC RX REV CODE- 250/636

## 2018-12-05 PROCEDURE — 80076 HEPATIC FUNCTION PANEL: CPT

## 2018-12-05 PROCEDURE — 71045 X-RAY EXAM CHEST 1 VIEW: CPT

## 2018-12-05 PROCEDURE — 74011250637 HC RX REV CODE- 250/637: Performed by: HOSPITALIST

## 2018-12-05 PROCEDURE — 85018 HEMOGLOBIN: CPT

## 2018-12-05 PROCEDURE — 94760 N-INVAS EAR/PLS OXIMETRY 1: CPT

## 2018-12-05 PROCEDURE — 80048 BASIC METABOLIC PNL TOTAL CA: CPT

## 2018-12-05 PROCEDURE — 93005 ELECTROCARDIOGRAM TRACING: CPT

## 2018-12-05 PROCEDURE — 65270000032 HC RM SEMIPRIVATE

## 2018-12-05 PROCEDURE — 74011250636 HC RX REV CODE- 250/636: Performed by: FAMILY MEDICINE

## 2018-12-05 PROCEDURE — 74011250637 HC RX REV CODE- 250/637: Performed by: INTERNAL MEDICINE

## 2018-12-05 PROCEDURE — 87040 BLOOD CULTURE FOR BACTERIA: CPT

## 2018-12-05 PROCEDURE — 83605 ASSAY OF LACTIC ACID: CPT

## 2018-12-05 RX ORDER — PANTOPRAZOLE SODIUM 40 MG/1
40 TABLET, DELAYED RELEASE ORAL
Status: DISCONTINUED | OUTPATIENT
Start: 2018-12-06 | End: 2018-12-07 | Stop reason: HOSPADM

## 2018-12-05 RX ORDER — CETIRIZINE HCL 10 MG
10 TABLET ORAL DAILY
Status: DISCONTINUED | OUTPATIENT
Start: 2018-12-06 | End: 2018-12-07 | Stop reason: HOSPADM

## 2018-12-05 RX ORDER — ALBUTEROL SULFATE 90 UG/1
2 AEROSOL, METERED RESPIRATORY (INHALATION)
COMMUNITY

## 2018-12-05 RX ORDER — HYDROMORPHONE HYDROCHLORIDE 2 MG/ML
1 INJECTION, SOLUTION INTRAMUSCULAR; INTRAVENOUS; SUBCUTANEOUS
Status: DISCONTINUED | OUTPATIENT
Start: 2018-12-05 | End: 2018-12-06

## 2018-12-05 RX ORDER — OMEPRAZOLE 10 MG/1
10 CAPSULE, DELAYED RELEASE ORAL DAILY
Status: DISCONTINUED | OUTPATIENT
Start: 2018-12-06 | End: 2018-12-05 | Stop reason: CLARIF

## 2018-12-05 RX ORDER — HYDROMORPHONE HYDROCHLORIDE 2 MG/ML
INJECTION, SOLUTION INTRAMUSCULAR; INTRAVENOUS; SUBCUTANEOUS
Status: DISPENSED
Start: 2018-12-05 | End: 2018-12-05

## 2018-12-05 RX ORDER — SODIUM CHLORIDE 0.9 % (FLUSH) 0.9 %
SYRINGE (ML) INJECTION
Status: DISPENSED
Start: 2018-12-05 | End: 2018-12-05

## 2018-12-05 RX ORDER — TRAZODONE HYDROCHLORIDE 100 MG/1
100 TABLET ORAL
Status: DISCONTINUED | OUTPATIENT
Start: 2018-12-05 | End: 2018-12-07 | Stop reason: HOSPADM

## 2018-12-05 RX ORDER — ACETAMINOPHEN 325 MG/1
650 TABLET ORAL ONCE
Status: COMPLETED | OUTPATIENT
Start: 2018-12-05 | End: 2018-12-05

## 2018-12-05 RX ADMIN — HYDROMORPHONE HYDROCHLORIDE 1 MG: 2 INJECTION INTRAMUSCULAR; INTRAVENOUS; SUBCUTANEOUS at 11:58

## 2018-12-05 RX ADMIN — SODIUM CHLORIDE 100 ML/HR: 900 INJECTION, SOLUTION INTRAVENOUS at 01:20

## 2018-12-05 RX ADMIN — SODIUM CHLORIDE 100 ML/HR: 900 INJECTION, SOLUTION INTRAVENOUS at 21:37

## 2018-12-05 RX ADMIN — HYDROMORPHONE HYDROCHLORIDE 1 MG: 2 INJECTION INTRAMUSCULAR; INTRAVENOUS; SUBCUTANEOUS at 01:20

## 2018-12-05 RX ADMIN — TRAZODONE HYDROCHLORIDE 100 MG: 100 TABLET ORAL at 21:37

## 2018-12-05 RX ADMIN — Medication 10 ML: at 06:58

## 2018-12-05 RX ADMIN — Medication 10 ML: at 11:58

## 2018-12-05 RX ADMIN — Medication 10 ML: at 21:38

## 2018-12-05 RX ADMIN — ACETAMINOPHEN 650 MG: 325 TABLET ORAL at 23:03

## 2018-12-05 RX ADMIN — HYDROMORPHONE HYDROCHLORIDE 1 MG: 2 INJECTION INTRAMUSCULAR; INTRAVENOUS; SUBCUTANEOUS at 06:58

## 2018-12-05 RX ADMIN — HYDROMORPHONE HYDROCHLORIDE 1 MG: 2 INJECTION INTRAMUSCULAR; INTRAVENOUS; SUBCUTANEOUS at 17:00

## 2018-12-05 NOTE — PROGRESS NOTES
Admission Medication Reconciliation: 
 
Information obtained from:  Patient, chart review, insurance claim information Comments/Recommendations: All medications/allergies have been reviewed and updated; last medication administration times reviewed and recorded. Changes made to Prior to Admission (PTA) Medication List: ? Medications Added:  
- albuterol inhaler ? Medications Changed:  
- None ? Medications Removed:  
- None Significant PMH/Disease States:  
Past Medical History:  
Diagnosis Date  Back pain Lumbar  Liver lesion Chief Complaint for this Admission: Chief Complaint Patient presents with  Abdominal Pain Allergies:  Pcn [penicillins] Prior to Admission Medications:  
Prior to Admission Medications Prescriptions Last Dose Informant Patient Reported? Taking? albuterol (PROVENTIL HFA, VENTOLIN HFA, PROAIR HFA) 90 mcg/actuation inhaler   Yes Yes Sig: Take 2 Puffs by inhalation every four (4) hours as needed for Wheezing. aspirin delayed-release 81 mg tablet 12/4/2018 at Unknown time  Yes Yes Sig: Take  by mouth daily. cetirizine (ZYRTEC) 10 mg tablet 12/4/2018 at Unknown time  Yes Yes Sig: Take  by mouth. omeprazole (PRILOSEC) 10 mg capsule 12/4/2018 at Unknown time  Yes Yes Sig: Take 10 mg by mouth daily. traZODone (DESYREL) 100 mg tablet 12/4/2018 at Unknown time  Yes Yes Sig: Take 100 mg by mouth nightly. Facility-Administered Medications: None Thank you for allowing pharmacy to participate in the coordination of this patient's care. If you have any other questions, please contact the medication reconciliation pharmacist at x [phone]. James Reeves., BCPS

## 2018-12-05 NOTE — ED NOTES
Gave bedside report regarding, SBAR, MAR, and plan of care to Albino [place]. Transfered care of patient to RN.

## 2018-12-05 NOTE — PROGRESS NOTES
0930: Lactic acid sent. Wife at bedside. Gen surg NP at bedside. She said that it was okay for patient to go to tele and able to have diet. RN will text MD.  
 
1150: TRANSFER - OUT REPORT: 
 
Verbal report given to Jorge Shelton RN (name) on Brandy Morin  being transferred to (unit) for routine progression of care Report consisted of patients Situation, Background, Assessment and  
Recommendations(SBAR). Information from the following report(s) SBAR, Kardex and ED Summary was reviewed with the receiving nurse. Lines:  
Peripheral IV 12/04/18 Left Antecubital (Active) Site Assessment Clean, dry, & intact 12/5/2018  8:00 AM  
Phlebitis Assessment 0 12/5/2018  8:00 AM  
Infiltration Assessment 0 12/5/2018  8:00 AM  
Dressing Status Clean, dry, & intact 12/5/2018  8:00 AM  
Dressing Type Transparent 12/5/2018  8:00 AM  
Hub Color/Line Status Flushed 12/5/2018  8:00 AM  
Action Taken Open ports on tubing capped 12/5/2018  8:00 AM  
Alcohol Cap Used Yes 12/5/2018  8:00 AM  
  
 
Opportunity for questions and clarification was provided. Patient transported with: 
 [employer]

## 2018-12-05 NOTE — ED PROVIDER NOTES
Genesis Ni is a 72 y.o. male who presents ambulatory to the ED with a c/o rt upper abdominal pain onset yesterday. Pt currently rates his pain at 3/10 in severity and describes his pain as \"dull, constant\". Pt reports that he had a liver biopsy done yesterday, and approximately 20 minutes after this procedure, he began having sharp abdominal pain. He reports that he was given IV Fentanyl and Percocet (PO) which took a while, but did eventually help his pain. Pt has taken a total of 1500mg of Tylenol since onset of sx. He also had 2 Hydrocodone today. Pt additionally c/o chills. Pt specifically denies chest pain, shortness of breath, n/v,d , fever, chills, numbness, tingling, back pain, cough, leg swelling, dizziness or any other acute sx. Pt denies any recent travel, known sick contacts, or recent illness. PCP: Saniya Sanchez MD 
PMHx significant for: Prostate CA, Chronic Pain, Liver Lesion PSHx significant for: Prostatectomy, Lower Back Surgery Social Hx: Tobacco: Former Smoker EtOH: none Illicit drug use: none There are no further complaints or symptoms at this time. Signed by: prachi Botelloibliz for Gi David MD on December 4th, 2018 at 20:20pm. 
 
 
 
 
  
 
Past Medical History:  
Diagnosis Date  Back pain Lumbar  Liver lesion Past Surgical History:  
Procedure Laterality Date  HX ORTHOPAEDIC Lower Back Surgery  HX PROSTATECTOMY Family History:  
Problem Relation Age of Onset  Cancer Mother  Cancer Father  Heart Disease Neg Hx Social History Socioeconomic History  Marital status:  Spouse name: Not on file  Number of children: Not on file  Years of education: Not on file  Highest education level: Not on file Social Needs  Financial resource strain: Not on file  Food insecurity - worry: Not on file  Food insecurity - inability: Not on file  Transportation needs - medical: Not on file  Transportation needs - non-medical: Not on file Occupational History  Not on file Tobacco Use  Smoking status: Former Smoker  Smokeless tobacco: Never Used Substance and Sexual Activity  Alcohol use: No  
  Frequency: Never  Drug use: No  
 Sexual activity: Not on file Other Topics Concern  Not on file Social History Narrative  Not on file ALLERGIES: Pcn [penicillins] Review of Systems Constitutional: Negative for chills and fever. Respiratory: Negative for cough and shortness of breath. All other systems reviewed and are negative. Vitals:  
 12/04/18 1813 12/04/18 1950 BP:  170/74 Pulse:  81 Resp:  16 Temp:  98.1 °F (36.7 °C) SpO2: 100% 97% Weight:  89.8 kg (198 lb) Height:  5' 4\" (1.626 m) Physical Exam  
Constitutional: He appears well-developed. No distress. HENT:  
Head: Normocephalic and atraumatic. Eyes: Pupils are equal, round, and reactive to light. No scleral icterus. Neck: Normal range of motion. Neck supple. Cardiovascular: Normal rate and regular rhythm. Pulmonary/Chest: Effort normal and breath sounds normal.  
Abdominal: Soft. He exhibits no distension. There is generalized tenderness. There is no rebound and no guarding. Musculoskeletal: Normal range of motion. Neurological: He is alert. Skin: Skin is warm and dry. He is not diaphoretic. Psychiatric: He has a normal mood and affect. His behavior is normal. Thought content normal.  
Nursing note and vitals reviewed. Signed by: Tri Yuen, scribing for Gi Hawkins MD on December 4th, 2018 at 20:20pm. 
 
MDM Number of Diagnoses or Management Options Diagnosis management comments: The patient is resting comfortably and feels better, is alert and in no distress. CT shows hepatic hematoma with a small amount of blood in the abdomen.   Pt is hemodynamically stable without active extravasation noted on imaging. The history, exam, diagnostic testing, and current condition do not suggest acute appendicitis, bowel obstruction, incarcerated hernia, acute cholecystitis, bowel perforation,severe diverticulitis, or sepsis. Procedures 9:11 PM 
Patient is being admitted to the hospital.  The results of their tests and reasons for their admission have been discussed with them and/or available family. They convey agreement and understanding for the need to be admitted and for their admission diagnosis. Consultation will be made now with the inpatient physician for hospitalization. 9:44 PM 
Pt has been updated on his CT results, and IR has been consulted. IR recommends hospitalist admission with Oncology and IR consults. 10:45 PM  
Dr. Dean Hairston consulted for admission

## 2018-12-05 NOTE — H&P
[facility] HISTORY AND PHYSICAL Javy Burnette 
MR#: [id number] : 1945 ACCOUNT #: [de-identified] ADMIT DATE: 2018 CHIEF COMPLAINT:  Abdominal pain. HISTORY OF PRESENT ILLNESS:  A 30-year-old -American male with past medical history of lumbar back pain, liver mass, status post CT-guided liver mass biopsy, presented to the emergency department from home with chief complaint of right upper quadrant abdominal and right flank pain. As mentioned, the patient has a history of a liver mass, for which he underwent CT-guided liver mass biopsy on 2018. The patient notes that he had onset of right upper quadrant abdominal pain. It radiates to his right flank and back that initially was mild dull but it became constant, worsened and now severe, rated at least 8/10. Pain has been worse prior to pain medications. Pain is now sharp without specific alleviating factors, exacerbated with any movement. The patient had been taking Tylenol and hydrocodone at home without relief of his symptoms. He notes that the pain is so severe that it \"takes my breath away. \"  As a result, he is having shallow breathing. Otherwise, he does not complain of any chest pain. He is not complaining of any dizziness or lightheadedness. Denies focal weakness, numbness, paresthesias, slurred speech, facial droop, headache, visual disturbance, neck pain, chest pain, vomiting, diarrhea, melena, dysuria, hematuria, calf pain, swelling, edema, fever, chills or rash. Upon arrival to the emergency department, his recorded vital signs were blood pressure 170/74, heart rate 81, respiratory rate of 16, O2 saturation 100% on room air. The workup including a CT abdomen and pelvis with IV contrast that showed subcapsular hematoma measuring 9.9 cm x 7.5 cm x 4.9 cm with multiple liver lesions with bilateral renal cysts.   Disk space narrowing of multiple lumbar levels and a small amount of hemorrhage in the pelvis. The patient's hemoglobin was decreased from 11.7 on 10/22/2018 to 10.7 on 12/04/2018. WBC was 12,600 with elevated LFTs, an ALT of 131, . ED MD consulted with on-call interventional radiologist, in regards to the patient's clinical findings. Request was for the patient to be admitted to the hospitalist service overnight pending further evaluation. PAST MEDICAL HISTORY:   
1.  Lumbar back pain. 2.  Liver mass. PAST SURGICAL HISTORY:  Prostatectomy. MEDICATIONS:  Medication list reviewed and noted on the chart record. Taking Last Dose Start Date End Date Provider   
 albuterol (PROVENTIL HFA, VENTOLIN HFA, PROAIR HFA) 90 mcg/actuation inhaler    --  --  Provider, Historical  
 Take 2 Puffs by inhalation every four (4) hours as needed for Wheezing. aspirin delayed-release 81 mg tablet  12/4/2018  --  --  Provider, Historical  
 Take  by mouth daily. cetirizine (ZYRTEC) 10 mg tablet  12/4/2018  --  --  Provider, Historical  
 Take  by mouth. omeprazole (PRILOSEC) 10 mg capsule  12/4/2018  --  --  Provider, Historical  
 Take 10 mg by mouth daily. traZODone (DESYREL) 100 mg tablet  12/4/2018  --  --  Provider, Historical  
 Take 100 mg by mouth nightly ALLERGIES:  PENICILLIN. SOCIAL HISTORY:  Former smoker of cigarettes. No reports of alcohol or illicit drugs. He is . FAMILY HISTORY:  Unspecified cancer, mother and father. REVIEW OF SYSTEMS:  Pertinent positives as noted in HPI. All other systems reviewed and negative. PHYSICAL EXAMINATION: 
VITAL SIGNS:  Temperature was 98.1 degrees Fahrenheit, blood pressure 163/68, heart rate 87, respiration rate of 23, O2 saturation 96% on room air. Reported weight 198 pounds (89.8 kg). Recorded height of 5 feet 4 inches tall. GENERAL:  The patient is in no acute respiratory distress. PSYCHIATRIC:  Awake, alert and oriented x3. NEUROLOGIC:  GCS 15, moves extremities x4. Sensation grossly intact. No slurred speech or facial droop. HEENT:  Normocephalic, atraumatic. PERRLA, EOMs intact, sclerae were anicteric. Conjunctivae clear. Nares are patent. Oropharynx clear. Tongue is midline, nonedematous. NECK:  Supple, without lymphadenopathy, JVD, carotid bruits or thyromegaly. LYMPHATIC:  Negative for cervical or supraclavicular adenopathy. RESPIRATORY:  Lungs clear to auscultation bilaterally. CARDIOVASCULAR:  Heart is regular rate and rhythm. Normal, S1, S2 with a II/VI systolic murmur heard best in the left upper sternal border. GASTROINTESTINAL:  Abdomen is distended. Generalized tenderness to palpation, mostly in the right upper quadrant with voluntary guarding, no rebound or rigidity, no auscultated abdominal bruits. No pulsatile abdominal mass. BACK:  Right CVA tenderness. No step-offs point. MUSCULOSKELETAL:  No acute palpable bony deformity. Negative for calf tenderness. VASCULAR:  2+ radial, 1+ dorsalis pedis pulses without cyanosis, clubbing or edema. SKIN:  Warm and dry. LABORATORY DATA:  Labs are reviewed as follows:  Sodium 138, potassium 4.3, chloride 105, CO2 of 28, BUN of 8, creatinine of 1.14, glucose 126, anion gap of 5, calcium is 8.5, GFR greater than 60, total bilirubin 0.3, total protein 7.5, albumin is 3.7, ALT of 131, AST is 97, alkaline phosphatase is 106. WBC 12.6, hemoglobin of 10.7, hematocrit 34.3, platelet is 145. Neutrophils of 75%. Urinalysis:  Leukocyte esterase negative, nitrites negative, urobilinogen 0.2, bilirubin negative, blood negative, ketones negative, glucose negative, protein negative, pH 6.0, specific gravity 1.011, WBC 0-4, RBCs 0-5, bacteria negative. INR 1.0, PT of 10.3 and PTT of 26.2. CT abdomen and pelvis with contrast, results are reviewed and noted in HPI.   Chest x-ray, portable, no acute disease with persistent elevation of the right hemidiaphragm. A 12-lead EKG:  Sinus rhythm, occasional premature ventricular complexes, 81 beats per minute. ASSESSMENT AND PLAN: 
1. Subcapsular hepatic hematoma. Will admit the patient to ICU. Place patient on q.1 hour vital sign monitoring. Repeat hemoglobin and hematocrit. Interventional radiology has been consulted with noted findings. We will keep patient n.p.o., IV fluids. 2.  Generalized abdominal/right upper quadrant abdominal pain. Provide pain management and supportive cares. 3.  Anemia. Repeat hemoglobin and hematocrit. Transfuse if hemoglobin is less than 7.0 or any hemodynamic instability. 4.  Leukocytosis. Repeat CBC. 5.  Elevated liver function tests. Repeat comprehensive metabolic panel. 6.  Elevated blood pressure without history of hypertension. May be pain related. Provide pain management, supportive cares. Will treat with antihypertensive if hypertension remains unresolved. 7.  Bilateral renal cysts -- noted on CT. 8.  Liver mass, pending results and pathology regarding the same. 9.  Venous thromboembolism prophylaxis. SCDs to bilateral extremities. MD LAKISHA Mccall/RN 
D: 12/05/2018 02:57    
T: 12/05/2018 03:30 
JOB #: [id number]

## 2018-12-05 NOTE — PROGRESS NOTES
Hospitalist Progress Note Kailash Gilbert MD 
Answering service: [phone] OR [phone] from in house phone Cell: [phone] Date of Service:  2018 NAME:  Jenni Dixon :  1945 MRN:  [id number] Admission Summary:  
 
72-year-old -American male with past medical history of lumbar back pain, liver mass, status post CT-guided liver mass biopsy, presented to the emergency department from home with chief complaint of right upper quadrant abdominal and right flank pain. As mentioned, the patient has a history of a liver mass, for which he underwent CT-guided liver mass biopsy on 2018. The patient notes that he had onset of right upper quadrant abdominal pain. It radiates to his right flank and back that initially was mild dull but it became constant, worsened and now severe, rated at least 8/10. Pain has been worse prior to pain medications. Pain is now sharp without specific alleviating factors, exacerbated with any movement. The patient had been taking Tylenol and hydrocodone at home without relief of his symptoms. He notes that the pain is so severe that it \"takes my breath away. \" As a result, he is having shallow breathing. Workup including a CT abdomen and pelvis with IV contrast that showed subcapsular hematoma measuring 9.9 cm x 7.5 cm x 4.9 cm with multiple liver lesions with bilateral renal cysts. Disk space narrowing of multiple lumbar levels and a small amount of hemorrhage in the pelvis. The patient's hemoglobin was decreased from 11.7 on 10/22/2018 to 10.7 on 2018. Interval history / Subjective:  
 
Patient stating his abdominal pain has largely resolved. Recounts the severe pain and dizziness that brought him in. Now feeling better and tolerating clears. Surgery following. Assessment & Plan:  
 
Subcapsular hepatic hematoma: 
-patient admitted to med/surg unit -continue to monitor vitals and track H/H  
-now on clears - OK per Surg 
-IR and Surg have been consulted Generalized abdominal/right upper quadrant abdominal pain, due to hematoma. -PRN pain meds Anemia, see above. Transfuse if hemoglobin is less than 7.0 Leukocytosis -?reactive - no fever or other sign of infection Elevated liver function tests: repeat in AM. ? Due to mass/infiltration Elevated blood pressure without history of hypertension. ? pain related 
-monitor Bilateral renal cysts -- noted on CT. Liver mass, pending path. 
  
Code status: Full DVT prophylaxis: SCDs Care Plan discussed with: Patient/Family and Nurse Disposition: Home w/Family and TBD Hospital Problems  Date Reviewed: 12/4/2018 Codes Class Noted POA  
 RUQ abdominal pain ICD-10-CM: R10.11 ICD-9-CM: 789.01  12/4/2018 Unknown * (Principal) Subcapsular hepatic hematoma ICD-10-CM: K76.89 
ICD-9-CM: 573.8  12/4/2018 Unknown Review of Systems:  
Pertinent items are noted in HPI. Vital Signs:  
 Last 24hrs VS reviewed since prior progress note. Most recent are: 
Visit Vitals /67 (BP 1 Location: Right arm, BP Patient Position: Head of bed elevated (Comment degrees)) Pulse 96 Temp 99.2 °F (37.3 °C) Resp 20 Ht 5' 4\" (1.626 m) Wt 89.8 kg (198 lb) SpO2 96% BMI 33.99 kg/m² Intake/Output Summary (Last 24 hours) at 12/5/2018 1629 Last data filed at 12/5/2018 1148 Gross per 24 hour Intake 926.67 ml Output 300 ml Net 626.67 ml Physical Examination:  
 
  
Constitutional:  No acute distress, cooperative, pleasant   
ENT:  Neck supple Resp:  CTA bilaterally. No accessory muscle use CV:  Regular rhythm, normal rate, no murmur GI:  Soft, non distended, tender in RU and LQ, normoactive bowel sounds Musculoskeletal:  No edema, warm Neurologic:  Moves all extremities. AAOx3 Data Review:  
 Review and/or order of clinical lab test 
 Review and/or order of tests in the radiology section of CPT Review and/or order of tests in the medicine section of CPT Labs:  
 
Recent Labs 12/05/18 
0353 12/04/18 1921 WBC 15.0* 12.6* HGB 9.8* 10.7* HCT 32.1* 34.3*  
 231 Recent Labs 12/05/18 
0353 12/04/18 1921  138  
K 4.1 4.3  105 CO2 29 28 BUN 8 8 CREA 0.99 1.14 * 126* CA 8.1* 8.5 Recent Labs 12/04/18 1921 SGOT 97* *  TBILI 0.3 TP 7.5 ALB 3.7 GLOB 3.8 Recent Labs 12/04/18 1921 INR 1.0 PTP 10.3 APTT 26.2 No results for input(s): FE, TIBC, PSAT, FERR in the last 72 hours. No results found for: FOL, RBCF No results for input(s): PH, PCO2, PO2 in the last 72 hours. No results for input(s): CPK, CKNDX, TROIQ in the last 72 hours. No lab exists for component: CPKMB No results found for: CHOL, CHOLX, CHLST, CHOLV, HDL, LDL, LDLC, DLDLP, TGLX, TRIGL, TRIGP, CHHD, CHHDX Lab Results Component Value Date/Time Glucose (POC) 100 10/22/2018 09:08 PM  
 
Lab Results Component Value Date/Time Color YELLOW/STRAW 12/04/2018 08:25 PM  
 Appearance CLEAR 12/04/2018 08:25 PM  
 Specific gravity 1.011 12/04/2018 08:25 PM  
 pH (UA) 6.0 12/04/2018 08:25 PM  
 Protein NEGATIVE  12/04/2018 08:25 PM  
 Glucose NEGATIVE  12/04/2018 08:25 PM  
 Ketone NEGATIVE  12/04/2018 08:25 PM  
 Bilirubin NEGATIVE  12/04/2018 08:25 PM  
 Urobilinogen 0.2 12/04/2018 08:25 PM  
 Nitrites NEGATIVE  12/04/2018 08:25 PM  
 Leukocyte Esterase NEGATIVE  12/04/2018 08:25 PM  
 Epithelial cells FEW 12/04/2018 08:25 PM  
 Bacteria NEGATIVE  12/04/2018 08:25 PM  
 WBC 0-4 12/04/2018 08:25 PM  
 RBC 0-5 12/04/2018 08:25 PM  
 
 
 
Medications Reviewed:  
 
Current Facility-Administered Medications Medication Dose Route Frequency  HYDROmorphone (DILAUDID) injection 1 mg  1 mg IntraVENous Q4H PRN  
 sodium chloride (NS) flush  sodium chloride (NS) flush 5-10 mL  5-10 mL IntraVENous Q8H  
 sodium chloride (NS) flush 5-10 mL  5-10 mL IntraVENous PRN  
 ondansetron (ZOFRAN) injection 4 mg  4 mg IntraVENous Q6H PRN  
 0.9% sodium chloride infusion  100 mL/hr IntraVENous CONTINUOUS Facility-Administered Medications Ordered in Other Encounters Medication Dose Route Frequency  fentaNYL citrate (PF) injection 100 mcg  100 mcg IntraVENous Multiple  midazolam (VERSED) injection 5 mg  5 mg IntraVENous Multiple  0.9% sodium chloride infusion  25 mL/hr IntraVENous CONTINUOUS  
 
______________________________________________________________________ EXPECTED LENGTH OF STAY: 2d 12h ACTUAL LENGTH OF STAY:          1 Tatiana De Souza MD

## 2018-12-05 NOTE — CONSULTS
General Surgery ER Consultation    Admit Date: 12/4/2018  Reason for Consultation: subcapsular hematoma on liver    HPI:  Bruno Wilkerson is a 72 y.o. male w/ hx of prostate cancer s/p prostatectomy in past now w/ liver masses was admitted yesterday w/ RUQ pain after bx of a liver mass on 12/3. Pt had this done at [facility]. Immediately after the procedure he had severe pain which subsided w/ pain meds. He went home and felt fine until yesterday. The pain returned - he treated it w/ hydrocodone and tylenol. It didn't subside so he came to the ED. He denies blood thinners w/ exception of a baby asa. No NSAIDs. Labs reviewed. Hgb on 10/22 - 11.7, 12/4 - 10.7, today 9.8. He denies pain, only pressure in RUQ. He is getting pain meds. CT scan  LIVER: Subcapsular hematoma measuring 9.9 x 7.5 x 4.9 cm (axial image 30 and  coronal image 63). Multiple liver lesions. Patient Active Problem List    Diagnosis Date Noted    RUQ abdominal pain 12/04/2018    Subcapsular hepatic hematoma 12/04/2018     Past Medical History:   Diagnosis Date    Back pain     Lumbar    Liver lesion       Past Surgical History:   Procedure Laterality Date    HX ORTHOPAEDIC      Lower Back Surgery    HX PROSTATECTOMY        Social History     Tobacco Use    Smoking status: Former Smoker    Smokeless tobacco: Never Used   Substance Use Topics    Alcohol use: No     Frequency: Never      Family History   Problem Relation Age of Onset    Cancer Mother     Cancer Father     Heart Disease Neg Hx       Prior to Admission medications    Medication Sig Start Date End Date Taking? Authorizing Provider   albuterol (PROVENTIL HFA, VENTOLIN HFA, PROAIR HFA) 90 mcg/actuation inhaler Take 2 Puffs by inhalation every four (4) hours as needed for Wheezing. Yes Provider, Historical   aspirin delayed-release 81 mg tablet Take  by mouth daily. Yes Provider, Historical   traZODone (DESYREL) 100 mg tablet Take 100 mg by mouth nightly.    Yes Provider, Historical   omeprazole (PRILOSEC) 10 mg capsule Take 10 mg by mouth daily. Yes Provider, Historical   cetirizine (ZYRTEC) 10 mg tablet Take  by mouth. Yes Provider, Historical     Allergies   Allergen Reactions    Pcn [Penicillins] Rash          Subjective:     Review of Systems:    A comprehensive review of systems was negative except for that written in the History of Present Illness. Objective:     Blood pressure 141/60, pulse 97, temperature 100.1 °F (37.8 °C), resp. rate 18, height 5' 4\" (1.626 m), weight 198 lb (89.8 kg), SpO2 95 %. Recent Results (from the past 24 hour(s))   TYPE & SCREEN    Collection Time: 12/04/18  7:13 PM   Result Value Ref Range    Crossmatch Expiration 12/07/2018     ABO/Rh(D) B POSITIVE     Antibody screen NEG    CBC WITH AUTOMATED DIFF    Collection Time: 12/04/18  7:21 PM   Result Value Ref Range    WBC 12.6 (H) 4.1 - 11.1 K/uL    RBC 4.86 4.10 - 5.70 M/uL    HGB 10.7 (L) 12.1 - 17.0 g/dL    HCT 34.3 (L) 36.6 - 50.3 %    MCV 70.6 (L) 80.0 - 99.0 FL    MCH 22.0 (L) 26.0 - 34.0 PG    MCHC 31.2 30.0 - 36.5 g/dL    RDW 16.1 (H) 11.5 - 14.5 %    PLATELET 455 855 - 470 K/uL    MPV 10.3 8.9 - 12.9 FL    NRBC 0.0 0  WBC    ABSOLUTE NRBC 0.00 0.00 - 0.01 K/uL    NEUTROPHILS 75 32 - 75 %    LYMPHOCYTES 13 12 - 49 %    MONOCYTES 11 5 - 13 %    EOSINOPHILS 1 0 - 7 %    BASOPHILS 0 0 - 1 %    IMMATURE GRANULOCYTES 0 0.0 - 0.5 %    ABS. NEUTROPHILS 9.5 (H) 1.8 - 8.0 K/UL    ABS. LYMPHOCYTES 1.6 0.8 - 3.5 K/UL    ABS. MONOCYTES 1.4 (H) 0.0 - 1.0 K/UL    ABS. EOSINOPHILS 0.1 0.0 - 0.4 K/UL    ABS. BASOPHILS 0.0 0.0 - 0.1 K/UL    ABS. IMM.  GRANS. 0.0 0.00 - 0.04 K/UL    DF AUTOMATED     METABOLIC PANEL, COMPREHENSIVE    Collection Time: 12/04/18  7:21 PM   Result Value Ref Range    Sodium 138 136 - 145 mmol/L    Potassium 4.3 3.5 - 5.1 mmol/L    Chloride 105 97 - 108 mmol/L    CO2 28 21 - 32 mmol/L    Anion gap 5 5 - 15 mmol/L    Glucose 126 (H) 65 - 100 mg/dL    BUN 8 6 - 20 MG/DL    Creatinine 1.14 0.70 - 1.30 MG/DL    BUN/Creatinine ratio 7 (L) 12 - 20      GFR est AA >60 >60 ml/min/1.73m2    GFR est non-AA >60 >60 ml/min/1.73m2    Calcium 8.5 8.5 - 10.1 MG/DL    Bilirubin, total 0.3 0.2 - 1.0 MG/DL    ALT (SGPT) 131 (H) 12 - 78 U/L    AST (SGOT) 97 (H) 15 - 37 U/L    Alk. phosphatase 106 45 - 117 U/L    Protein, total 7.5 6.4 - 8.2 g/dL    Albumin 3.7 3.5 - 5.0 g/dL    Globulin 3.8 2.0 - 4.0 g/dL    A-G Ratio 1.0 (L) 1.1 - 2.2     PTT    Collection Time: 12/04/18  7:21 PM   Result Value Ref Range    aPTT 26.2 22.1 - 32.0 sec    aPTT, therapeutic range     58.0 - 77.0 SECS   PROTHROMBIN TIME + INR    Collection Time: 12/04/18  7:21 PM   Result Value Ref Range    INR 1.0 0.9 - 1.1      Prothrombin time 10.3 9.0 - 11.1 sec   SAMPLES BEING HELD    Collection Time: 12/04/18  7:21 PM   Result Value Ref Range    SAMPLES BEING HELD 1RED     COMMENT        Add-on orders for these samples will be processed based on acceptable specimen integrity and analyte stability, which may vary by analyte. URINALYSIS W/MICROSCOPIC    Collection Time: 12/04/18  8:25 PM   Result Value Ref Range    Color YELLOW/STRAW      Appearance CLEAR CLEAR      Specific gravity 1.011 1.003 - 1.030      pH (UA) 6.0 5.0 - 8.0      Protein NEGATIVE  NEG mg/dL    Glucose NEGATIVE  NEG mg/dL    Ketone NEGATIVE  NEG mg/dL    Bilirubin NEGATIVE  NEG      Blood NEGATIVE  NEG      Urobilinogen 0.2 0.2 - 1.0 EU/dL    Nitrites NEGATIVE  NEG      Leukocyte Esterase NEGATIVE  NEG      WBC 0-4 0 - 4 /hpf    RBC 0-5 0 - 5 /hpf    Epithelial cells FEW FEW /lpf    Bacteria NEGATIVE  NEG /hpf    Hyaline cast 0-2 0 - 5 /lpf   URINE CULTURE HOLD SAMPLE    Collection Time: 12/04/18  8:25 PM   Result Value Ref Range    Urine culture hold        URINE ON HOLD IN MICROBIOLOGY DEPT FOR 3 DAYS. IF UNPRESERVED URINE IS SUBMITTED, IT CANNOT BE USED FOR ADDITIONAL TESTING AFTER 24 HRS, RECOLLECTION WILL BE REQUIRED.    EKG, 12 LEAD, INITIAL Collection Time: 12/05/18 12:06 AM   Result Value Ref Range    Ventricular Rate 81 BPM    Atrial Rate 81 BPM    P-R Interval 176 ms    QRS Duration 86 ms    Q-T Interval 340 ms    QTC Calculation (Bezet) 394 ms    Calculated P Axis 57 degrees    Calculated R Axis 75 degrees    Calculated T Axis -24 degrees    Diagnosis       Sinus rhythm with occasional premature ventricular complexes  When compared with ECG of 22-OCT-2018 21:41,  ST now depressed in Inferior leads  T wave inversion more evident in Inferior leads  T wave inversion now evident in Lateral leads  Confirmed by Bubba Hoover M.D., Gautam Bentley ([id number]) on 12/5/2018 8:77:30 AM     METABOLIC PANEL, BASIC    Collection Time: 12/05/18  3:53 AM   Result Value Ref Range    Sodium 139 136 - 145 mmol/L    Potassium 4.1 3.5 - 5.1 mmol/L    Chloride 105 97 - 108 mmol/L    CO2 29 21 - 32 mmol/L    Anion gap 5 5 - 15 mmol/L    Glucose 114 (H) 65 - 100 mg/dL    BUN 8 6 - 20 MG/DL    Creatinine 0.99 0.70 - 1.30 MG/DL    BUN/Creatinine ratio 8 (L) 12 - 20      GFR est AA >60 >60 ml/min/1.73m2    GFR est non-AA >60 >60 ml/min/1.73m2    Calcium 8.1 (L) 8.5 - 10.1 MG/DL   CBC WITH AUTOMATED DIFF    Collection Time: 12/05/18  3:53 AM   Result Value Ref Range    WBC 15.0 (H) 4.1 - 11.1 K/uL    RBC 4.53 4.10 - 5.70 M/uL    HGB 9.8 (L) 12.1 - 17.0 g/dL    HCT 32.1 (L) 36.6 - 50.3 %    MCV 70.9 (L) 80.0 - 99.0 FL    MCH 21.6 (L) 26.0 - 34.0 PG    MCHC 30.5 30.0 - 36.5 g/dL    RDW 16.1 (H) 11.5 - 14.5 %    PLATELET 047 901 - 113 K/uL    MPV 9.6 8.9 - 12.9 FL    NRBC 0.0 0  WBC    ABSOLUTE NRBC 0.00 0.00 - 0.01 K/uL    NEUTROPHILS 74 32 - 75 %    LYMPHOCYTES 11 (L) 12 - 49 %    MONOCYTES 13 5 - 13 %    EOSINOPHILS 0 0 - 7 %    BASOPHILS 0 0 - 1 %    IMMATURE GRANULOCYTES 1 (H) 0.0 - 0.5 %    ABS. NEUTROPHILS 11.1 (H) 1.8 - 8.0 K/UL    ABS. LYMPHOCYTES 1.7 0.8 - 3.5 K/UL    ABS. MONOCYTES 2.0 (H) 0.0 - 1.0 K/UL    ABS. EOSINOPHILS 0.1 0.0 - 0.4 K/UL    ABS.  BASOPHILS 0.0 0.0 - 0.1 K/UL    ABS. IMM. GRANS. 0.1 (H) 0.00 - 0.04 K/UL    DF AUTOMATED       _____________________  Physical Exam:     General:  Alert, cooperative, no distress, appears stated age. Eyes:   Sclera clear. Throat: Lips, mucosa, and tongue normal.   Neck: Supple, symmetrical, trachea midline. Lungs:   Clear to auscultation bilaterally. Heart:  Regular rate and rhythm. Abdomen:   Soft, TTP RUQ,. Bowel sounds normal. No masses,  No organomegaly. Extremities: Extremities normal, atraumatic, no cyanosis or edema. Skin: Skin color, texture, turgor normal. No rashes or lesions. Assessment:   Principal Problem:    Subcapsular hepatic hematoma (12/4/2018)    Active Problems:    RUQ abdominal pain (12/4/2018)            Plan:     Serial H/H  May have clear liquids  May go to floor from ED (not ICU)  We will cont to follow    Total time spent with patient: 25 minutes.     Signed By: Cathy Hoyt NP     December 5, 2018

## 2018-12-06 LAB
ANION GAP SERPL CALC-SCNC: 5 MMOL/L (ref 5–15)
BUN SERPL-MCNC: 10 MG/DL (ref 6–20)
BUN/CREAT SERPL: 9 (ref 12–20)
CALCIUM SERPL-MCNC: 8.5 MG/DL (ref 8.5–10.1)
CHLORIDE SERPL-SCNC: 104 MMOL/L (ref 97–108)
CO2 SERPL-SCNC: 27 MMOL/L (ref 21–32)
CREAT SERPL-MCNC: 1.06 MG/DL (ref 0.7–1.3)
GLUCOSE SERPL-MCNC: 108 MG/DL (ref 65–100)
HCT VFR BLD AUTO: 30 % (ref 36.6–50.3)
HCT VFR BLD AUTO: 31.4 % (ref 36.6–50.3)
HGB BLD-MCNC: 9.3 G/DL (ref 12.1–17)
HGB BLD-MCNC: 9.8 G/DL (ref 12.1–17)
POTASSIUM SERPL-SCNC: 4.1 MMOL/L (ref 3.5–5.1)
SODIUM SERPL-SCNC: 136 MMOL/L (ref 136–145)

## 2018-12-06 PROCEDURE — 74011250637 HC RX REV CODE- 250/637: Performed by: INTERNAL MEDICINE

## 2018-12-06 PROCEDURE — 74011250636 HC RX REV CODE- 250/636: Performed by: FAMILY MEDICINE

## 2018-12-06 PROCEDURE — 85014 HEMATOCRIT: CPT

## 2018-12-06 PROCEDURE — 80048 BASIC METABOLIC PNL TOTAL CA: CPT

## 2018-12-06 PROCEDURE — 65270000032 HC RM SEMIPRIVATE

## 2018-12-06 PROCEDURE — 36415 COLL VENOUS BLD VENIPUNCTURE: CPT

## 2018-12-06 RX ORDER — OXYCODONE HYDROCHLORIDE 5 MG/1
5 TABLET ORAL
Status: DISCONTINUED | OUTPATIENT
Start: 2018-12-06 | End: 2018-12-07 | Stop reason: HOSPADM

## 2018-12-06 RX ADMIN — Medication 10 ML: at 23:31

## 2018-12-06 RX ADMIN — Medication 10 ML: at 14:00

## 2018-12-06 RX ADMIN — PANTOPRAZOLE SODIUM 40 MG: 40 TABLET, DELAYED RELEASE ORAL at 06:54

## 2018-12-06 RX ADMIN — OXYCODONE HYDROCHLORIDE 5 MG: 5 TABLET ORAL at 15:49

## 2018-12-06 RX ADMIN — TRAZODONE HYDROCHLORIDE 100 MG: 100 TABLET ORAL at 22:00

## 2018-12-06 RX ADMIN — SODIUM CHLORIDE 100 ML/HR: 900 INJECTION, SOLUTION INTRAVENOUS at 06:54

## 2018-12-06 RX ADMIN — CETIRIZINE HYDROCHLORIDE 10 MG: 10 TABLET, FILM COATED ORAL at 10:51

## 2018-12-06 NOTE — PROGRESS NOTES
Reason for Admission:   Subcapsular hepatic hematoma, RUQ abdominal pain RRAT Score:     15 Do you (patient/family) have any concerns for transition/discharge? Currently no d/c needs identified. Plan for utilizing home health:     No need for [facility] identified at this time Likelihood of readmission? Moderate based on RRAT. Transition of Care Plan:      CM met with Patient (alert and sitting upright) and Wife in room for verification of demographics and needs assessment. Patient is completely independent with self care and ADL's and is able to drive himself to medical appointments. 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 Patient expressed that he is very positive regarding his physical health and feels \"I have everything I need. \"  
 
CM identifies no d/c needs at this time. Will continue to follow. Care Management Interventions PCP Verified by CM: Yes Mode of Transport at Discharge: Other (see comment)(Wife will drive home) Current Support Network: Lives with Spouse, Own Home Confirm Follow Up Transport: Family Plan discussed with Pt/Family/Caregiver: Yes Freedom of Choice Offered:  Yes

## 2018-12-06 NOTE — PROGRESS NOTES
Patient has fever of 100.8. Lactic acid was drawn today but no blood cultures. Patients ALT and AST are elevated. Dr. Tommy Carter paged and aware of lab work and gave orders to draw paired blood cultures and give a one time dose of 650 mg PO tylenol.

## 2018-12-06 NOTE — PROGRESS NOTES
Hospitalist Progress Note Irina Bernal MD 
Answering service: [phone] OR [phone] from in house phone Cell: [phone] Date of Service:  2018 NAME:  Yadira Downey :  1945 MRN:  [id number] Admission Summary:  
 
70-year-old -American male with past medical history of lumbar back pain, liver mass, status post CT-guided liver mass biopsy, presented to the emergency department from home with chief complaint of right upper quadrant abdominal and right flank pain. As mentioned, the patient has a history of a liver mass, for which he underwent CT-guided liver mass biopsy on 2018. The patient notes that he had onset of right upper quadrant abdominal pain. It radiates to his right flank and back that initially was mild dull but it became constant, worsened and now severe, rated at least 8/10. Pain has been worse prior to pain medications. Pain is now sharp without specific alleviating factors, exacerbated with any movement. The patient had been taking Tylenol and hydrocodone at home without relief of his symptoms. He notes that the pain is so severe that it \"takes my breath away. \" As a result, he is having shallow breathing. Workup including a CT abdomen and pelvis with IV contrast that showed subcapsular hematoma measuring 9.9 cm x 7.5 cm x 4.9 cm with multiple liver lesions with bilateral renal cysts. Disk space narrowing of multiple lumbar levels and a small amount of hemorrhage in the pelvis. The patient's hemoglobin was decreased from 11.7 on 10/22/2018 to 10.7 on 2018. Interval history / Subjective:  
 
Patient stating his abdominal pain has resolved. Feeling better. Denies any symptoms despite fever. No cough, dysuria, worsening abdominal pain. H/H stable. Surgery following. Assessment & Plan:  
 
Subcapsular hepatic hematoma: 
-patient admitted to med/surg unit -continue to monitor vitals and track H/H  
-now on clears - OK per Surg 
-IR and Surg have been consulted 
-?monitor one more day and if stable discharge Fever and Leukocytosis:  
-lactic acid WNL on admission 
-?no source for infection - hold off on abx for now 
-blood cultures pending - NGTD Generalized abdominal/right upper quadrant abdominal pain, due to hematoma. -PRN pain meds Anemia, see above. Transfuse if hemoglobin is less than 7.0 Elevated liver function tests: ?Due to mass/infiltration 
-path pending Elevated blood pressure without history of hypertension. ? pain related 
-monitor Bilateral renal cysts - noted on CT. Liver mass, pending path. 
  
Code status: Full DVT prophylaxis: SCDs Care Plan discussed with: Patient/Family and Nurse Disposition: Home w/Family and TBD Hospital Problems  Date Reviewed: 12/4/2018 Codes Class Noted POA  
 RUQ abdominal pain ICD-10-CM: R10.11 ICD-9-CM: 789.01  12/4/2018 Unknown * (Principal) Subcapsular hepatic hematoma ICD-10-CM: K76.89 
ICD-9-CM: 573.8  12/4/2018 Unknown Review of Systems:  
Pertinent items are noted in HPI. Vital Signs:  
 Last 24hrs VS reviewed since prior progress note. Most recent are: 
Visit Vitals /88 (BP 1 Location: Right arm, BP Patient Position: At rest) Pulse 85 Temp 98.3 °F (36.8 °C) Resp 18 Ht 5' 4\" (1.626 m) Wt 89.8 kg (198 lb) SpO2 98% BMI 33.99 kg/m² Intake/Output Summary (Last 24 hours) at 12/6/2018 5239 Last data filed at 12/6/2018 4535 Gross per 24 hour Intake 260 ml Output 1300 ml Net -1040 ml Physical Examination:  
 
  
Constitutional:  No acute distress, cooperative, pleasant   
ENT:  Neck supple Resp:  CTA bilaterally. No accessory muscle use CV:  Regular rhythm, normal rate, no murmur GI:  Slightly tense and distended, tender in RU and LQ, normoactive bowel sounds, no guarding Musculoskeletal:  No edema, warm Neurologic:  Moves all extremities. AAOx3 Data Review:  
 Review and/or order of clinical lab test 
Review and/or order of tests in the radiology section of CPT Review and/or order of tests in the medicine section of CPT Labs:  
 
Recent Labs 12/06/18 
0514 12/05/18 
1704 12/05/18 0353 12/04/18 1921 WBC  --   --  15.0* 12.6* HGB 9.8* 10.3* 9.8* 10.7* HCT 31.4* 33.9* 32.1* 34.3*  
PLT  --   --  211 231 Recent Labs 12/06/18 
0514 12/05/18 0353 12/04/18 1921  139 138  
K 4.1 4.1 4.3  105 105 CO2 27 29 28 BUN 10 8 8 CREA 1.06 0.99 1.14 * 114* 126* CA 8.5 8.1* 8.5 Recent Labs 12/05/18 1704 12/04/18 1921 SGOT 70* 97* * 131* AP 95 106 TBILI 0.8 0.3 TP 7.4 7.5 ALB 3.5 3.7 GLOB 3.9 3.8 Recent Labs 12/04/18 1921 INR 1.0 PTP 10.3 APTT 26.2 No results for input(s): FE, TIBC, PSAT, FERR in the last 72 hours. No results found for: FOL, RBCF No results for input(s): PH, PCO2, PO2 in the last 72 hours. No results for input(s): CPK, CKNDX, TROIQ in the last 72 hours. No lab exists for component: CPKMB No results found for: CHOL, CHOLX, CHLST, CHOLV, HDL, LDL, LDLC, DLDLP, TGLX, TRIGL, TRIGP, CHHD, CHHDX Lab Results Component Value Date/Time Glucose (POC) 100 10/22/2018 09:08 PM  
 
Lab Results Component Value Date/Time  Color YELLOW/STRAW 12/04/2018 08:25 PM  
 Appearance CLEAR 12/04/2018 08:25 PM  
 Specific gravity 1.011 12/04/2018 08:25 PM  
 pH (UA) 6.0 12/04/2018 08:25 PM  
 Protein NEGATIVE  12/04/2018 08:25 PM  
 Glucose NEGATIVE  12/04/2018 08:25 PM  
 Ketone NEGATIVE  12/04/2018 08:25 PM  
 Bilirubin NEGATIVE  12/04/2018 08:25 PM  
 Urobilinogen 0.2 12/04/2018 08:25 PM  
 Nitrites NEGATIVE  12/04/2018 08:25 PM  
 Leukocyte Esterase NEGATIVE  12/04/2018 08:25 PM  
 Epithelial cells FEW 12/04/2018 08:25 PM  
 Bacteria NEGATIVE  12/04/2018 08:25 PM  
 WBC 0-4 12/04/2018 08:25 PM  
 RBC 0-5 12/04/2018 08:25 PM  
 
 
 
Medications Reviewed:  
 
Current Facility-Administered Medications Medication Dose Route Frequency  HYDROmorphone (DILAUDID) injection 1 mg  1 mg IntraVENous Q4H PRN  
 cetirizine (ZYRTEC) tablet 10 mg  10 mg Oral DAILY  traZODone (DESYREL) tablet 100 mg  100 mg Oral QHS  pantoprazole (PROTONIX) tablet 40 mg  40 mg Oral ACB  sodium chloride (NS) flush 5-10 mL  5-10 mL IntraVENous Q8H  
 sodium chloride (NS) flush 5-10 mL  5-10 mL IntraVENous PRN  
 ondansetron (ZOFRAN) injection 4 mg  4 mg IntraVENous Q6H PRN  
 0.9% sodium chloride infusion  100 mL/hr IntraVENous CONTINUOUS Facility-Administered Medications Ordered in Other Encounters Medication Dose Route Frequency  fentaNYL citrate (PF) injection 100 mcg  100 mcg IntraVENous Multiple  midazolam (VERSED) injection 5 mg  5 mg IntraVENous Multiple  0.9% sodium chloride infusion  25 mL/hr IntraVENous CONTINUOUS  
 
______________________________________________________________________ EXPECTED LENGTH OF STAY: 2d 12h ACTUAL LENGTH OF STAY:          2 Danya Ibrahim MD

## 2018-12-06 NOTE — PROGRESS NOTES
Bedside shift change report given to [facility] (oncoming nurse) by Raghu Gleason (offgoing nurse). Report included the following information SBAR, Kardex, Intake/Output, MAR and Recent Results.

## 2018-12-06 NOTE — PROGRESS NOTES
Bedside shift change report given to Corinne (oncoming nurse) by Jak Lopez (offgoing nurse). Report included the following information SBAR, Kardex and MAR.

## 2018-12-06 NOTE — PROGRESS NOTES
CC: 72 y.o patient of Bailee Kim/Lily Farris/Tomas Mccoy referred 12/6/2018 for evaluation of liver masses. HPI: 
He was in good health until about 1 month ago when developed fatigue. He was evaluated at [facility] where CXR suggested hilar mass. Subsequent CT scan did not show hilar mass but did suggest liver lesions. 12/3/2018 CT guided biopsy was performed at [facility]  
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 Past Medical History:  
Diagnosis Date  Back pain    
  Lumbar  Liver lesion    
  
     
Past Surgical History:  
Procedure Laterality Date  HX ORTHOPAEDIC      
  Lower Back Surgery  HX PROSTATECTOMY      
  
Social History  
  
     
Tobacco Use  Smoking status: Former Smoker  Smokeless tobacco: Never Used Substance Use Topics  Alcohol use: No  
    Frequency: Never Family History Problem Relation Age of Onset  Cancer Mother    
 Cancer Father    
 Heart Disease Neg Hx    
  
       
Prior to Admission medications Medication Sig Start Date End Date Taking? Authorizing Provider  
albuterol (PROVENTIL HFA, VENTOLIN HFA, PROAIR HFA) 90 mcg/actuation inhaler Take 2 Puffs by inhalation every four (4) hours as needed for Wheezing.     Yes Provider, Historical  
aspirin delayed-release 81 mg tablet Take  by mouth daily.     Yes Provider, Historical  
traZODone (DESYREL) 100 mg tablet Take 100 mg by mouth nightly.     Yes Provider, Historical  
omeprazole (PRILOSEC) 10 mg capsule Take 10 mg by mouth daily.     Yes Provider, Historical  
cetirizine (ZYRTEC) 10 mg tablet Take  by mouth.     Yes Provider, Historical  
  
    
Allergies Allergen Reactions  Pcn [Penicillins] Rash  
  
  
  
Subjective:  
  
Review of Systems: A comprehensive review of systems was negative except for that written in the History of Present Illness.  
  
   
Objective:  
  
Blood pressure 141/60, pulse 97, temperature 100.1 °F (37.8 °C), resp. rate 18, height 5' 4\" (1.626 m), weight 198 lb (89.8 kg), SpO2 95 %. Recent Results Recent Results (from the past 24 hour(s)) TYPE & SCREEN  
  Collection Time: 12/04/18  7:13 PM  
Result Value Ref Range  
  Crossmatch Expiration 12/07/2018    
  ABO/Rh(D) B POSITIVE    
  Antibody screen NEG    
CBC WITH AUTOMATED DIFF  
  Collection Time: 12/04/18  7:21 PM  
Result Value Ref Range  
  WBC 12.6 (H) 4.1 - 11.1 K/uL  
  RBC 4.86 4.10 - 5.70 M/uL  
  HGB 10.7 (L) 12.1 - 17.0 g/dL  
  HCT 34.3 (L) 36.6 - 50.3 %  
  MCV 70.6 (L) 80.0 - 99.0 FL  
  MCH 22.0 (L) 26.0 - 34.0 PG  
  MCHC 31.2 30.0 - 36.5 g/dL  
  RDW 16.1 (H) 11.5 - 14.5 %  
  PLATELET 337 578 - 217 K/uL  
  MPV 10.3 8.9 - 12.9 FL  
  NRBC 0.0 0  WBC  
  ABSOLUTE NRBC 0.00 0.00 - 0.01 K/uL  
  NEUTROPHILS 75 32 - 75 %  
  LYMPHOCYTES 13 12 - 49 %  
  MONOCYTES 11 5 - 13 %  
  EOSINOPHILS 1 0 - 7 %  
  BASOPHILS 0 0 - 1 %  
  IMMATURE GRANULOCYTES 0 0.0 - 0.5 %  
  ABS. NEUTROPHILS 9.5 (H) 1.8 - 8.0 K/UL  
  ABS. LYMPHOCYTES 1.6 0.8 - 3.5 K/UL  
  ABS. MONOCYTES 1.4 (H) 0.0 - 1.0 K/UL  
  ABS. EOSINOPHILS 0.1 0.0 - 0.4 K/UL  
  ABS. BASOPHILS 0.0 0.0 - 0.1 K/UL  
  ABS. IMM. GRANS. 0.0 0.00 - 0.04 K/UL  
  DF AUTOMATED METABOLIC PANEL, COMPREHENSIVE  
  Collection Time: 12/04/18  7:21 PM  
Result Value Ref Range  
  Sodium 138 136 - 145 mmol/L  
  Potassium 4.3 3.5 - 5.1 mmol/L  
  Chloride 105 97 - 108 mmol/L  
  CO2 28 21 - 32 mmol/L  
  Anion gap 5 5 - 15 mmol/L  
  Glucose 126 (H) 65 - 100 mg/dL  
  BUN 8 6 - 20 MG/DL  
  Creatinine 1.14 0.70 - 1.30 MG/DL  
  BUN/Creatinine ratio 7 (L) 12 - 20    
  GFR est AA >60 >60 ml/min/1.73m2  
  GFR est non-AA >60 >60 ml/min/1.73m2  
  Calcium 8.5 8.5 - 10.1 MG/DL  
  Bilirubin, total 0.3 0.2 - 1.0 MG/DL  
  ALT (SGPT) 131 (H) 12 - 78 U/L  
  AST (SGOT) 97 (H) 15 - 37 U/L  
   Alk. phosphatase 106 45 - 117 U/L  
  Protein, total 7.5 6.4 - 8.2 g/dL  
  Albumin 3.7 3.5 - 5.0 g/dL  
  Globulin 3.8 2.0 - 4.0 g/dL  
  A-G Ratio 1.0 (L) 1.1 - 2.2 PTT  
  Collection Time: 12/04/18  7:21 PM  
Result Value Ref Range  
  aPTT 26.2 22.1 - 32.0 sec  
  aPTT, therapeutic range     58.0 - 77.0 SECS  
PROTHROMBIN TIME + INR  
  Collection Time: 12/04/18  7:21 PM  
Result Value Ref Range  
  INR 1.0 0.9 - 1.1    
  Prothrombin time 10.3 9.0 - 11.1 sec SAMPLES BEING HELD  
  Collection Time: 12/04/18  7:21 PM  
Result Value Ref Range  
  SAMPLES BEING HELD 1RED    
  COMMENT      
    Add-on orders for these samples will be processed based on acceptable specimen integrity and analyte stability, which may vary by analyte. URINALYSIS W/MICROSCOPIC  
  Collection Time: 12/04/18  8:25 PM  
Result Value Ref Range  
  Color YELLOW/STRAW    
  Appearance CLEAR CLEAR    
  Specific gravity 1.011 1.003 - 1.030    
  pH (UA) 6.0 5.0 - 8.0    
  Protein NEGATIVE  NEG mg/dL  
  Glucose NEGATIVE  NEG mg/dL  
  Ketone NEGATIVE  NEG mg/dL  
  Bilirubin NEGATIVE  NEG    
  Blood NEGATIVE  NEG    
  Urobilinogen 0.2 0.2 - 1.0 EU/dL  
  Nitrites NEGATIVE  NEG    
  Leukocyte Esterase NEGATIVE  NEG    
  WBC 0-4 0 - 4 /hpf  
  RBC 0-5 0 - 5 /hpf  
  Epithelial cells FEW FEW /lpf  
  Bacteria NEGATIVE  NEG /hpf  
  Hyaline cast 0-2 0 - 5 /lpf URINE CULTURE HOLD SAMPLE  
  Collection Time: 12/04/18  8:25 PM  
Result Value Ref Range  
  Urine culture hold      
    URINE ON HOLD IN MICROBIOLOGY DEPT FOR 3 DAYS. IF UNPRESERVED URINE IS SUBMITTED, IT CANNOT BE USED FOR ADDITIONAL TESTING AFTER 24 HRS, RECOLLECTION WILL BE REQUIRED. EKG, 12 LEAD, INITIAL  
  Collection Time: 12/05/18 12:06 AM  
Result Value Ref Range  
  Ventricular Rate 81 BPM  
  Atrial Rate 81 BPM  
  P-R Interval 176 ms  
  QRS Duration 86 ms  
  Q-T Interval 340 ms  
  QTC Calculation (Bezet) 394 ms  
  Calculated P Axis 57 degrees   Calculated R Axis 75 degrees  
  Calculated T Axis -24 degrees  
  Diagnosis      
    Sinus rhythm with occasional premature ventricular complexes When compared with ECG of 22-OCT-2018 21:41, 
ST now depressed in Inferior leads T wave inversion more evident in Inferior leads T wave inversion now evident in Lateral leads Confirmed by Nitish Harry M.D., Vera Ha ([id number]) on 12/5/2018 3:35:07 AM 
   
METABOLIC PANEL, BASIC  
  Collection Time: 12/05/18  3:53 AM  
Result Value Ref Range  
  Sodium 139 136 - 145 mmol/L  
  Potassium 4.1 3.5 - 5.1 mmol/L  
  Chloride 105 97 - 108 mmol/L  
  CO2 29 21 - 32 mmol/L  
  Anion gap 5 5 - 15 mmol/L  
  Glucose 114 (H) 65 - 100 mg/dL  
  BUN 8 6 - 20 MG/DL  
  Creatinine 0.99 0.70 - 1.30 MG/DL  
  BUN/Creatinine ratio 8 (L) 12 - 20    
  GFR est AA >60 >60 ml/min/1.73m2  
  GFR est non-AA >60 >60 ml/min/1.73m2  
  Calcium 8.1 (L) 8.5 - 10.1 MG/DL  
CBC WITH AUTOMATED DIFF  
  Collection Time: 12/05/18  3:53 AM  
Result Value Ref Range  
  WBC 15.0 (H) 4.1 - 11.1 K/uL  
  RBC 4.53 4.10 - 5.70 M/uL  
  HGB 9.8 (L) 12.1 - 17.0 g/dL  
  HCT 32.1 (L) 36.6 - 50.3 %  
  MCV 70.9 (L) 80.0 - 99.0 FL  
  MCH 21.6 (L) 26.0 - 34.0 PG  
  MCHC 30.5 30.0 - 36.5 g/dL  
  RDW 16.1 (H) 11.5 - 14.5 %  
  PLATELET 024 339 - 457 K/uL  
  MPV 9.6 8.9 - 12.9 FL  
  NRBC 0.0 0  WBC  
  ABSOLUTE NRBC 0.00 0.00 - 0.01 K/uL  
  NEUTROPHILS 74 32 - 75 %  
  LYMPHOCYTES 11 (L) 12 - 49 %  
  MONOCYTES 13 5 - 13 %  
  EOSINOPHILS 0 0 - 7 %  
  BASOPHILS 0 0 - 1 %  
  IMMATURE GRANULOCYTES 1 (H) 0.0 - 0.5 %  
  ABS. NEUTROPHILS 11.1 (H) 1.8 - 8.0 K/UL  
  ABS. LYMPHOCYTES 1.7 0.8 - 3.5 K/UL  
  ABS. MONOCYTES 2.0 (H) 0.0 - 1.0 K/UL  
  ABS. EOSINOPHILS 0.1 0.0 - 0.4 K/UL  
  ABS. BASOPHILS 0.0 0.0 - 0.1 K/UL  
  ABS. IMM. GRANS. 0.1 (H) 0.00 - 0.04 K/UL  
  DF AUTOMATED    
  
  
_____________________ Physical Exam:  
  
General:  Alert, cooperative, no distress, appears stated age. Eyes:   Sclera clear. Throat: Lips, mucosa, and tongue normal.  
Neck: Supple, symmetrical, trachea midline. Lungs:   Clear to auscultation bilaterally. Heart:  Regular rate and rhythm. Abdomen:   Soft, TTP RUQ,. Bowel sounds normal. No masses,  No organomegaly. Extremities: Extremities normal, atraumatic, no cyanosis or edema. Skin: Skin color, texture, turgor normal. No rashes or lesions.  
  
     
  
Assessment:  
 Subcapsular hepatic hematoma (12/4/2018) 
  
Malignant Epitheliiod Hemangioepithilioma Rx: 
 home when Stable F/U Dr. Esvin Glaser [facility] EM[facility] MultiD hepatobiliary conference Dsic with pt/Wife: 
Dx/Rx/F/U reviewed 
jhe has rare tumor with no standard therapy We will present conference at [facility] HPI/PMH/FH/SH/ROS/PE reviewed and repeated at bedside VCI notes copied to chart below. Patient Name:  Boris Bowen Date: 11/15/2018 Patient Number: [id number] YOB: 1945 INITIAL CONSULTATION Chief Complaint Evaluation and management of liver lesions, presumed mets. HPI Mr. Facundo Ford is a 73 y/o male who presents today accompanied by his wife to evaluate new liver lesions that are suspicious for mets. He 
had been feeling poorly about a month ago when he was seen at [facility].  An xray first suggested a hilar mass. A CT scan was 
recommended. However, a f/u CT scan done 10/12/18 did not show any evidence of a hilar mass but there was some scarring the RLL and 
LLL. It did show an enlarged thyroid gland as well as a thyroid density measuring 2.3 cm w/mass effect on the trachea. It also showed 
liver lesions w/illdefined margins which were suspicious. MRI was recommended. MRI of the abdomen was done and the liver lesions are 
suggestive of mets. He, therefore, presents here. He has not had any unintentional weight loss. He had noticed some RUQ abdominal 
intermittent pressure pain.   He has a h/o prostate cancer in 2005, had a prostatectomy. Apparently his prostate is undetectable. He has 
been having some night sweats. His appetite is excellent and, as mentioned, no weight loss. He is a former tobacco smoker. He is a 
former . His last colonoscopy was about 45 years ago which was normal.  He denies any blood stools or black tarry stools. ROS Except as noted above or in the HPI, a 10 point ROS is negative. Vital Signs Vitals on 11/15/2018 4:29:00 PM: Height=63.25in, Weight=198.0lb, Temp=98.0f, WXIQI=06, PTAH=49, KMNBAVRTHQ=142, GXEOONGJNZY=08, Pulse Ox=99% Exam 
ECOG Scale 0: Fully active, able to carry on all predisease performance without restriction Constitutional: Alert and oriented. Eyes: Conjunctivae/sclerae are clear? no scleral icterus. ENMT: Oropharynx w/o lesions Hematologic/Lymphatic: No petechiae or purpura. No tender or palpable lymph nodes in the cervical, supraclavicular, axillary or inguinal 
area. Respiratory: Lungs are clear to auscultation without rhonchi or wheezing. Cardiovascular: RRR? no murmurs, gallops or rubs. Abdomen: Nontender? nondistended? no masses/ascites/hepatosplenomegaly? no guarding or rebound tenderness? no pulsatile masses. Musculoskeletal: No tenderness or swelling? normal ROM without obvious weakness. Skin: No rashes, lesions suggestive of malignancy. Neurologic: Alert, oriented and nonfocal. 
Family History: Father  at 67 of pancreatic cancer. Mother  at 67 of breast cancer. Social History:  He is . Quit tobacco in the 70's. Occasional alcohol. Retired . Allergies Penicillins Medications Emma Fix : [phone] Page 1 of 3 Inside Drug Script Date Qty Rfls Instructions 
aspirin 81 mg tablet,delayed release 11/15/2018 0 1 p.o. q. day Prilosec 10 mg oral suspension,delayed release 11/15/2018 0 1 p.o. q. day 
trazodone 100 mg tablet 11/15/2018 0 1 p.o. q. day Zyrtec 10 mg tablet 11/15/2018 0 1 p.o. q. day Labs Lab results on 11/15/2018: CEA=1.6 ng/mL, PZ70_1=3 U/mL, AFP=2.8 ng/mL  
11/15/18 UE09_9=4 Imaging Results Primary Diagnosis Date Type ICD9 ICD10 Description Disease Status Status Date 10/15/2018 Primary 789.1 R16.0 Hepatomegaly, not elsewhere classified 10/15/2018 Secondary V10.46 Z85.46 Personal history of malignant neoplasm 
of prostate Disease Features 10/12/18  CT chest/abdomen/pelvis showed numerous liver lesions, indeterminate, MRI suggested. Lungs clear w/o any evidence of hilar 
mass. Mildly enlarged lymph node, R paratracheal region. Enlarged thyroid w/R>L bilat nodules. MRI liver shows liver mets. Current Treatment Workup. Past Medical/Surgical History Epiglottitis Hyperlipidemia Prostate cancer in  s/p prostatectomy Central retinal vein occlusion in  Ruptured disk, L4/5 in  Fall Tonsillectomy Pilonidal cystectomy in  Plan of Care for Pain Assessment and Plan 1) This is a 72 male w/liver lesions suggestive of mets: 
He is currently being evaluated by ENT for a thyroid mass. Discussed w/him and his wife at this point will need to proceed w/a liver bx to Boris Bowen : 1945 ([id number]) Page 2 of 3  
establish: 1) Malignancy, and 2) Possible primary site. Will do alpha fetoprotein, , CEA and set him up for a liver bx. I will see him 
back after the bx for f/u to discuss dx, prognosis and possible txment options or further workup if needed. 2) Thyroid mass: 
He had an ultrasound done this morning. Will await the results. He will also have a bx? this will be done by Dr. Lamont Pathak. I look forward to collaborating w/ in the care of Mr. Facundo Ford. Other Physicians: 
Aidee cSott [phone]? Tomas [phone]? Provider Name Contact Information Physician Ton Glaser MD 
Primary Care Provider Aidee Scott MD Fax: [phone], Work: [phone] Work: [facility] [address], [phone] Transcribed Arcadio Gaytan, 11/19/2018 3:35:48 PM 
Signed By: ___________________________ Jewels Rosen MD, NPI: [id number], on 11/26/2018 at 8:02 PM

## 2018-12-06 NOTE — PROGRESS NOTES
A Spiritual Care Partner Volunteer visited patient in Rm 509 on 12/6/2018. Documented by: 
Chaplain Schuler MDiv, MS, [facility] 
[phone] PRAY ([phone])

## 2018-12-07 VITALS
HEIGHT: 64 IN | RESPIRATION RATE: 18 BRPM | WEIGHT: 198 LBS | HEART RATE: 85 BPM | OXYGEN SATURATION: 98 % | TEMPERATURE: 98.8 F | BODY MASS INDEX: 33.8 KG/M2 | SYSTOLIC BLOOD PRESSURE: 118 MMHG | DIASTOLIC BLOOD PRESSURE: 67 MMHG

## 2018-12-07 LAB
ANION GAP SERPL CALC-SCNC: 7 MMOL/L (ref 5–15)
BASOPHILS # BLD: 0 K/UL (ref 0–0.1)
BASOPHILS NFR BLD: 0 % (ref 0–1)
BUN SERPL-MCNC: 7 MG/DL (ref 6–20)
BUN/CREAT SERPL: 7 (ref 12–20)
CALCIUM SERPL-MCNC: 8.1 MG/DL (ref 8.5–10.1)
CHLORIDE SERPL-SCNC: 107 MMOL/L (ref 97–108)
CO2 SERPL-SCNC: 25 MMOL/L (ref 21–32)
CREAT SERPL-MCNC: 1.06 MG/DL (ref 0.7–1.3)
DIFFERENTIAL METHOD BLD: ABNORMAL
EOSINOPHIL # BLD: 0.2 K/UL (ref 0–0.4)
EOSINOPHIL NFR BLD: 2 % (ref 0–7)
ERYTHROCYTE [DISTWIDTH] IN BLOOD BY AUTOMATED COUNT: 15.2 % (ref 11.5–14.5)
GLUCOSE SERPL-MCNC: 110 MG/DL (ref 65–100)
HCT VFR BLD AUTO: 28.5 % (ref 36.6–50.3)
HGB BLD-MCNC: 9 G/DL (ref 12.1–17)
IMM GRANULOCYTES # BLD: 0 K/UL (ref 0–0.04)
IMM GRANULOCYTES NFR BLD AUTO: 0 % (ref 0–0.5)
LYMPHOCYTES # BLD: 2.1 K/UL (ref 0.8–3.5)
LYMPHOCYTES NFR BLD: 20 % (ref 12–49)
MCH RBC QN AUTO: 22.1 PG (ref 26–34)
MCHC RBC AUTO-ENTMCNC: 31.6 G/DL (ref 30–36.5)
MCV RBC AUTO: 70 FL (ref 80–99)
MONOCYTES # BLD: 1.4 K/UL (ref 0–1)
MONOCYTES NFR BLD: 13 % (ref 5–13)
NEUTS SEG # BLD: 6.7 K/UL (ref 1.8–8)
NEUTS SEG NFR BLD: 64 % (ref 32–75)
NRBC # BLD: 0 K/UL (ref 0–0.01)
NRBC BLD-RTO: 0 PER 100 WBC
PLATELET # BLD AUTO: 202 K/UL (ref 150–400)
PMV BLD AUTO: 10.3 FL (ref 8.9–12.9)
POTASSIUM SERPL-SCNC: 3.8 MMOL/L (ref 3.5–5.1)
RBC # BLD AUTO: 4.07 M/UL (ref 4.1–5.7)
SODIUM SERPL-SCNC: 139 MMOL/L (ref 136–145)
WBC # BLD AUTO: 10.5 K/UL (ref 4.1–11.1)

## 2018-12-07 PROCEDURE — 80048 BASIC METABOLIC PNL TOTAL CA: CPT

## 2018-12-07 PROCEDURE — 85025 COMPLETE CBC W/AUTO DIFF WBC: CPT

## 2018-12-07 PROCEDURE — 74011250637 HC RX REV CODE- 250/637: Performed by: INTERNAL MEDICINE

## 2018-12-07 PROCEDURE — 36415 COLL VENOUS BLD VENIPUNCTURE: CPT

## 2018-12-07 RX ORDER — OXYCODONE HYDROCHLORIDE 5 MG/1
5 TABLET ORAL
Qty: 10 TAB | Refills: 0 | Status: SHIPPED | OUTPATIENT
Start: 2018-12-07

## 2018-12-07 RX ORDER — ONDANSETRON 4 MG/1
4 TABLET, FILM COATED ORAL
Qty: 10 TAB | Refills: 0 | Status: SHIPPED | OUTPATIENT
Start: 2018-12-07

## 2018-12-07 RX ADMIN — CETIRIZINE HYDROCHLORIDE 10 MG: 10 TABLET, FILM COATED ORAL at 10:19

## 2018-12-07 RX ADMIN — Medication 10 ML: at 05:13

## 2018-12-07 RX ADMIN — PANTOPRAZOLE SODIUM 40 MG: 40 TABLET, DELAYED RELEASE ORAL at 06:40

## 2018-12-07 NOTE — PROGRESS NOTES
Hospital follow-up PCP transitional care appointment has been scheduled with Dr. Hedy Ford for Thursday, 12/13/18 at 11:00 a.m. Pending patient discharge.   Gaurang Rojo, Care Management Specialist.

## 2018-12-07 NOTE — DISCHARGE SUMMARY
Discharge Summary       PATIENT ID: Amber Alaniz  MRN: [id number]   YOB: 1945    DATE OF ADMISSION: 12/4/2018  8:08 PM    DATE OF DISCHARGE: 12/7/2018  PRIMARY CARE PROVIDER: Saurabh Duran MD     DISCHARGING PROVIDER: Sal Bright MD    To contact this individual call [phone] and ask the  to page. If unavailable ask to be transferred the Adult Hospitalist Department. CONSULTATIONS: IP CONSULT TO HOSPITALIST  IP CONSULT TO INTERVENTIONAL RADIOLOGY  IP CONSULT TO ONCOLOGY  IP CONSULT TO GENERAL SURGERY    PROCEDURES/SURGERIES: * No surgery found *    [address] [facility] COURSE:     72-year-old -American male with past medical history of lumbar back pain, liver mass, status post CT-guided liver mass biopsy, presented to the emergency department from home with chief complaint of right upper quadrant abdominal and right flank pain. As mentioned, the patient has a history of a liver mass, for which he underwent CT-guided liver mass biopsy on 12/03/2018. The patient notes that he had onset of right upper quadrant abdominal pain. It radiates to his right flank and back that initially was mild dull but it became constant, worsened and now severe, rated at least 8/10.  Pain has been worse prior to pain medications. Workup including a CT abdomen and pelvis with IV contrast that showed subcapsular hematoma measuring 9.9 cm x 7.5 cm x 4.9 cm with multiple liver lesions with bilateral renal cysts. Disk space narrowing of multiple lumbar levels and a small amount of hemorrhage in the pelvis. The patient's hemoglobin was decreased from 11.7 on 10/22/2018 to 10.7 on 12/04/2018.      Observed inpatient. Surgery evaluated. H/H stable on discharge and no plan for intervention. 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  Oncology discussed this with him inpatient and he is to follow-up in clinic for treatment plan.     Sadie Ibarra / PLAN:      Subcapsular hepatic hematoma:  -H/H stable on discharge   -tolerated Regular diet - OK for discharge per Surgery - no plan for intervention     Fever and Leukocytosis, resolved:   -lactic acid WNL on admission  -no source for infection   -blood cultures - NGTD     Generalized abdominal/right upper quadrant abdominal pain, due to hematoma.  -resolving  -PRN pain meds     Anemia, see above. H/H stable.     Elevated liver function tests: ?Due to mass/infiltration  -path, as above  -Oncology follow-up     Elevated blood pressure without history of hypertension, resolved.     Bilateral renal cysts - noted on CT.     Liver mass, see above. PENDING TEST RESULTS:   At the time of discharge the following test results are still pending: Final Micro    FOLLOW UP APPOINTMENTS:    Follow-up Information     Follow up With Specialties Details Why Contact Info    Bonifacio Ames MD [facility] Schedule an appointment as soon as possible for a visit on 12/13/2018 [facility] f/u PCP appointment Thursday, 12/13/18 @ 11:00 a.m.  [address]      Arnulfo Mcqueen MD Hematology and Oncology, Hematology, Oncology Schedule an appointment as soon as possible for a visit  [place] [address]  [phone]           ADDITIONAL CARE RECOMMENDATIONS:     DIET: Regular Diet    ACTIVITY: Activity as tolerated    DISCHARGE MEDICATIONS:  Current Discharge Medication List      START taking these medications    Details   oxyCODONE IR (ROXICODONE) 5 mg immediate release tablet Take 1 Tab by mouth every four (4) hours as needed. Max Daily Amount: 30 mg.  Qty: 10 Tab, Refills: 0    Associated Diagnoses: RUQ abdominal pain; Subcapsular hepatic hematoma      ondansetron hcl (ZOFRAN) 4 mg tablet Take 1 Tab by mouth every eight (8) hours as needed for Nausea.   Qty: 10 Tab, Refills: 0         CONTINUE these medications which have NOT CHANGED Details   albuterol (PROVENTIL HFA, VENTOLIN HFA, PROAIR HFA) 90 mcg/actuation inhaler Take 2 Puffs by inhalation every four (4) hours as needed for Wheezing. traZODone (DESYREL) 100 mg tablet Take 100 mg by mouth nightly. omeprazole (PRILOSEC) 10 mg capsule Take 10 mg by mouth daily. cetirizine (ZYRTEC) 10 mg tablet Take  by mouth. STOP taking these medications       aspirin delayed-release 81 mg tablet Comments:   Reason for Stopping:               NOTIFY YOUR PHYSICIAN FOR ANY OF THE FOLLOWING:   Fever over 101 degrees for 24 hours. Chest pain, shortness of breath, fever, chills, nausea, vomiting, diarrhea, change in mentation, falling, weakness, bleeding. Severe pain or pain not relieved by medications. Or, any other signs or symptoms that you may have questions about. DISPOSITION:    Home With:   OT  PT  HH  RN       Long term SNF/Inpatient Rehab    Independent/assisted living    Hospice    Other:       PATIENT CONDITION AT DISCHARGE:     Functional status    Poor     Deconditioned     Independent      Cognition     Lucid     Forgetful     Dementia      Catheters/lines (plus indication)    Pena     PICC     PEG     None      Code status     Full code     DNR      PHYSICAL EXAMINATION AT DISCHARGE:    Constitutional:  No acute distress, cooperative, pleasant    ENT:  Neck supple   Resp:  CTA bilaterally. No accessory muscle use   CV:  Regular rhythm, normal rate, no murmur    GI:  Less tense and distended, mildly tender in RU and LQ, normoactive bowel sounds, no guarding    Musculoskeletal:  No edema, warm    Neurologic:  Moves all extremities.   AAOx3     CHRONIC MEDICAL DIAGNOSES:  Problem List as of 12/7/2018 Date Reviewed: 12/4/2018          Codes Class Noted - Resolved    RUQ abdominal pain ICD-10-CM: R10.11  ICD-9-CM: 789.01  12/4/2018 - Present        * (Principal) Subcapsular hepatic hematoma ICD-10-CM: K76.89  ICD-9-CM: 573.8  12/4/2018 - Present            Greater than 30 minutes were spent with the patient on counseling and coordination of care    Signed:   Courtney Ha MD  12/7/2018  1:41 PM

## 2018-12-07 NOTE — DISCHARGE INSTRUCTIONS
Discharge Instructions       PATIENT ID: Fifi Klein  MRN: [id number]   YOB: 1945    DATE OF ADMISSION: 12/4/2018  8:08 PM    DATE OF DISCHARGE: 12/7/2018    PRIMARY CARE PROVIDER: Marisela Rahman MD     ATTENDING PHYSICIAN: Jayda Santoyo MD  DISCHARGING PROVIDER: Danyell Jack MD    To contact this individual call [phone] and ask the  to page. If unavailable ask to be transferred the Adult Hospitalist Department. DISCHARGE DIAGNOSES hepatic hematoma, liver cancer    CONSULTATIONS: IP CONSULT TO HOSPITALIST  IP CONSULT TO INTERVENTIONAL RADIOLOGY  IP CONSULT TO ONCOLOGY  IP CONSULT TO GENERAL SURGERY    PROCEDURES/SURGERIES: * No surgery found *    PENDING TEST RESULTS:   At the time of discharge the following test results are still pending: None    FOLLOW UP APPOINTMENTS:   Follow-up Information     Follow up With Specialties Details Why Contact Info    Marisela Rahman MD [facility] Schedule an appointment as soon as possible for a visit  [address] [place] [address]      Rashawn Olszewski, MD Hematology and Oncology, Hematology, Oncology Schedule an appointment as soon as possible for a visit  [place] [phone]             ADDITIONAL CARE RECOMMENDATIONS:     You came in with abdominal pain and were found to have a collection of blood near your liver due to a recent biopsy you had. The surgery team evaluated you and your blood count has been stable. No intervention is needed and it should slowly resorb over time. Your biopsy result came back as a rare form of cancer and the Oncology team discussed this with you. Please see Dr. Gerard Hernandez in clinic for further treatment plan. DIET: Regular Diet    ACTIVITY: Activity as tolerated    DISCHARGE MEDICATIONS:   See Medication Reconciliation Form    · It is important that you take the medication exactly as they are prescribed. · Keep your medication in the bottles provided by the pharmacist and keep a list of the medication names, dosages, and times to be taken in your wallet. · Do not take other medications without consulting your doctor. NOTIFY YOUR PHYSICIAN FOR ANY OF THE FOLLOWING:   Fever over 101 degrees for 24 hours. Chest pain, shortness of breath, fever, chills, nausea, vomiting, diarrhea, change in mentation, falling, weakness, bleeding. Severe pain or pain not relieved by medications. Or, any other signs or symptoms that you may have questions about. DISPOSITION:    Home With:   OT  PT  HH  RN       SNF/Inpatient Rehab/LTAC    Independent/assisted living    Hospice    Other:     Signed:   Basil Rojas MD  2018  10:13 AM    [employer] Activation    Thank you for requesting access to [employer]. Please follow the instructions below to securely access and download your online medical record. [employer] allows you to send messages to your doctor, view your test results, renew your prescriptions, schedule appointments, and more. How Do I Sign Up? 1. In your internet browser, go to [email or web address]  2. Click on the First Time User? Click Here link in the Sign In box. You will be redirect to the New Member Sign Up page. 3. Enter your [employer] Access Code exactly as it appears below. You will not need to use this code after youve completed the sign-up process. If you do not sign up before the expiration date, you must request a new code. [employer] Access Code: [email or web address]  Expires: 2019 12:34 PM (This is the date your [employer] access code will )    4. Enter the last four digits of your Social Security Number (xxxx) and Date of Birth (mm/dd/yyyy) as indicated and click Submit. You will be taken to the next sign-up page. 5. Create a [employer] ID. This will be your [employer] login ID and cannot be changed, so think of one that is secure and easy to remember. 6. Create a [employer] password.  You can change your password at any time. 7. Enter your Password Reset Question and Answer. This can be used at a later time if you forget your password. 8. Enter your e-mail address. You will receive e-mail notification when new information is available in [address] 9. Click Sign Up. You can now view and download portions of your medical record. 10. Click the Download Summary menu link to download a portable copy of your medical information. Additional Information    If you have questions, please visit the Frequently Asked Questions section of the [employer] website at [email or web address] Remember, [employer] is NOT to be used for urgent needs. For medical emergencies, dial 911. DISCHARGE SUMMARY from Nurse    PATIENT INSTRUCTIONS:    After general anesthesia or intravenous sedation, for 24 hours or while taking prescription Narcotics:  · Limit your activities  · Do not drive and operate hazardous machinery  · Do not make important personal or business decisions  · Do  not drink alcoholic beverages  · If you have not urinated within 8 hours after discharge, please contact your surgeon on call. Report the following to your surgeon:  · Excessive pain, swelling, redness or odor of or around the surgical area  · Temperature over 100.5  · Nausea and vomiting lasting longer than 4 hours or if unable to take medications  · Any signs of decreased circulation or nerve impairment to extremity: change in color, persistent  numbness, tingling, coldness or increase pain  · Any questions    What to do at Home:          *  Please give a list of your current medications to your Primary Care Provider. *  Please update this list whenever your medications are discontinued, doses are      changed, or new medications (including over-the-counter products) are added. *  Please carry medication information at all times in case of emergency situations.     These are general instructions for a healthy lifestyle:    No smoking/ No tobacco products/ Avoid exposure to second hand smoke  Surgeon General's Warning:  Quitting smoking now greatly reduces serious risk to your health. Obesity, smoking, and sedentary lifestyle greatly increases your risk for illness    A healthy diet, regular physical exercise & weight monitoring are important for maintaining a healthy lifestyle    You may be retaining fluid if you have a history of heart failure or if you experience any of the following symptoms:  Weight gain of 3 pounds or more overnight or 5 pounds in a week, increased swelling in our hands or feet or shortness of breath while lying flat in bed. Please call your doctor as soon as you notice any of these symptoms; do not wait until your next office visit. Recognize signs and symptoms of STROKE:    F-face looks uneven    A-arms unable to move or move unevenly    S-speech slurred or non-existent    T-time-call 911 as soon as signs and symptoms begin-DO NOT go       Back to bed or wait to see if you get better-TIME IS BRAIN. Warning Signs of HEART ATTACK     Call 911 if you have these symptoms:   Chest discomfort. Most heart attacks involve discomfort in the center of the chest that lasts more than a few minutes, or that goes away and comes back. It can feel like uncomfortable pressure, squeezing, fullness, or pain.  Discomfort in other areas of the upper body. Symptoms can include pain or discomfort in one or both arms, the back, neck, jaw, or stomach.  Shortness of breath with or without chest discomfort.  Other signs may include breaking out in a cold sweat, nausea, or lightheadedness. Don't wait more than five minutes to call 911 - MINUTES MATTER! Fast action can save your life. Calling 911 is almost always the fastest way to get lifesaving treatment. Emergency Medical Services staff can begin treatment when they arrive -- up to an hour sooner than if someone gets to the hospital by car.      The discharge information has been reviewed with the patient. The patient verbalized understanding. Discharge medications reviewed with the patient and appropriate educational materials and side effects teaching were provided.   ___________________________________________________________________________________________________________________________________

## 2018-12-07 NOTE — PROGRESS NOTES
5501 Per Night shift RN Dr. Kale Humphreys wants patient to be cleared by surgery prior to discharge planning, paged NP for Gen surg to clarify.

## 2018-12-07 NOTE — PROGRESS NOTES
· Surgery Progress Note 12/7/2018 Admit Date: 12/4/2018 Subjective:  
 
 
Medicine team asked for surgery to see pt this morning as they are ready for DC planning Pt has complaint of some RUQ pressure and fullness noted, no acute issues overnight, feels his symptoms are improving but notes he has now transitioned to oral analgesics and so he \"feels it more\", . Pts pain present - adequately treated. No SOB. No CP. Pt is ambulating. Patient 's current diet is Clear Liquid. . Pt reports  no nausea and no vomiting. Pt reports no fever or chills Bowel Movements: Normal and Flatus Objective:  
 
Blood pressure 118/67, pulse 85, temperature 98.8 °F (37.1 °C), resp. rate 18, height 5' 4\" (1.626 m), weight 198 lb (89.8 kg), SpO2 98 %. No intake/output data recorded. 12/05 1901 - 12/07 0700 In: -  
Out: 649 [CQHMR:412] General appearance: alert, cooperative, no distress, appears stated age Lungs: clear to auscultation bilaterally Heart: regular rate and rhythm Abd:soft, protuberant, nontender, without guarding, without rebound Extremities: no edema Neurologic: Grossly normal 
 
Data Review Recent Results (from the past 12 hour(s)) CBC WITH AUTOMATED DIFF Collection Time: 12/07/18  4:44 AM  
Result Value Ref Range WBC 10.5 4.1 - 11.1 K/uL  
 RBC 4.07 (L) 4.10 - 5.70 M/uL HGB 9.0 (L) 12.1 - 17.0 g/dL HCT 28.5 (L) 36.6 - 50.3 % MCV 70.0 (L) 80.0 - 99.0 FL  
 MCH 22.1 (L) 26.0 - 34.0 PG  
 MCHC 31.6 30.0 - 36.5 g/dL  
 RDW 15.2 (H) 11.5 - 14.5 % PLATELET 336 878 - 899 K/uL MPV 10.3 8.9 - 12.9 FL  
 NRBC 0.0 0  WBC ABSOLUTE NRBC 0.00 0.00 - 0.01 K/uL NEUTROPHILS 64 32 - 75 % LYMPHOCYTES 20 12 - 49 % MONOCYTES 13 5 - 13 % EOSINOPHILS 2 0 - 7 % BASOPHILS 0 0 - 1 % IMMATURE GRANULOCYTES 0 0.0 - 0.5 % ABS. NEUTROPHILS 6.7 1.8 - 8.0 K/UL  
 ABS. LYMPHOCYTES 2.1 0.8 - 3.5 K/UL  
 ABS. MONOCYTES 1.4 (H) 0.0 - 1.0 K/UL ABS. EOSINOPHILS 0.2 0.0 - 0.4 K/UL  
 ABS. BASOPHILS 0.0 0.0 - 0.1 K/UL  
 ABS. IMM. GRANS. 0.0 0.00 - 0.04 K/UL  
 DF AUTOMATED METABOLIC PANEL, BASIC Collection Time: 12/07/18  4:44 AM  
Result Value Ref Range Sodium 139 136 - 145 mmol/L Potassium 3.8 3.5 - 5.1 mmol/L Chloride 107 97 - 108 mmol/L  
 CO2 25 21 - 32 mmol/L Anion gap 7 5 - 15 mmol/L Glucose 110 (H) 65 - 100 mg/dL BUN 7 6 - 20 MG/DL Creatinine 1.06 0.70 - 1.30 MG/DL  
 BUN/Creatinine ratio 7 (L) 12 - 20 GFR est AA >60 >60 ml/min/1.73m2 GFR est non-AA >60 >60 ml/min/1.73m2 Calcium 8.1 (L) 8.5 - 10.1 MG/DL Assessment:  
 
Principal Problem: 
  Subcapsular hepatic hematoma (12/4/2018) Active Problems: 
  RUQ abdominal pain (12/4/2018) 4 days s/p perc liver bx--pathology report as follows: 
 
 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  
Diet pt has been on liquids only thus far, OK for regular diet today, no surgical plan  
Pain management GI Prophylaxis OOB 
hgb 9.0--no ongoing  bleeding suspected, VSS, abdomen is benign, advance diet to see how he does, Heme/Onc following for further tx plan Further plan per Dr. Analy Peters PA-C

## 2018-12-07 NOTE — PROGRESS NOTES
Patient discharged in stable condition. Discharge prescriptions and instructions given and understood and opportunity for questions given. No report of pain, bleeding or sob at discharge.

## 2018-12-07 NOTE — PROGRESS NOTES
Bedside shift change report given to [facility] (oncoming nurse) by Trista Alonso RN (offgoing nurse). Report included the following information SBAR, Kardex, ED Summary, Intake/Output, MAR and Recent Results.

## 2018-12-07 NOTE — PROGRESS NOTES
Bedside and Verbal shift change report given to Miya Harley RN  (oncoming nurse) by Rosenda Madison (offgoing nurse). Report included the following information SBAR and Kardex.

## 2018-12-08 ENCOUNTER — HOSPITAL ENCOUNTER (EMERGENCY)
Age: 73
Discharge: HOME | End: 2018-12-09
Attending: EMERGENCY MEDICINE
Payer: MEDICARE

## 2018-12-08 ENCOUNTER — APPOINTMENT (OUTPATIENT)
Dept: CT IMAGING | Age: 73
End: 2018-12-08
Attending: EMERGENCY MEDICINE
Payer: MEDICARE

## 2018-12-08 DIAGNOSIS — R06.00 DYSPNEA, UNSPECIFIED TYPE: ICD-10-CM

## 2018-12-08 DIAGNOSIS — R50.9 FEVER, UNSPECIFIED FEVER CAUSE: ICD-10-CM

## 2018-12-08 DIAGNOSIS — K76.89 SUBCAPSULAR HEPATIC HEMATOMA: Primary | ICD-10-CM

## 2018-12-08 LAB
ALBUMIN SERPL-MCNC: 3.3 G/DL (ref 3.5–5)
ALBUMIN/GLOB SERPL: 0.8 {RATIO} (ref 1.1–2.2)
ALP SERPL-CCNC: 146 U/L (ref 45–117)
ALT SERPL-CCNC: 65 U/L (ref 12–78)
ANION GAP SERPL CALC-SCNC: 11 MMOL/L (ref 5–15)
APPEARANCE UR: CLEAR
APTT PPP: 26.5 SEC (ref 22.1–32)
AST SERPL-CCNC: 35 U/L (ref 15–37)
BACTERIA URNS QL MICRO: NEGATIVE /HPF
BASOPHILS # BLD: 0 K/UL (ref 0–0.1)
BASOPHILS NFR BLD: 0 % (ref 0–1)
BILIRUB SERPL-MCNC: 0.4 MG/DL (ref 0.2–1)
BILIRUB UR QL: NEGATIVE
BUN SERPL-MCNC: 6 MG/DL (ref 6–20)
BUN/CREAT SERPL: 5 (ref 12–20)
CALCIUM SERPL-MCNC: 8.9 MG/DL (ref 8.5–10.1)
CHLORIDE SERPL-SCNC: 103 MMOL/L (ref 97–108)
CO2 SERPL-SCNC: 26 MMOL/L (ref 21–32)
COLOR UR: NORMAL
COMMENT, HOLDF: NORMAL
CREAT SERPL-MCNC: 1.1 MG/DL (ref 0.7–1.3)
DIFFERENTIAL METHOD BLD: ABNORMAL
EOSINOPHIL # BLD: 0.2 K/UL (ref 0–0.4)
EOSINOPHIL NFR BLD: 2 % (ref 0–7)
EPITH CASTS URNS QL MICRO: NORMAL /LPF
ERYTHROCYTE [DISTWIDTH] IN BLOOD BY AUTOMATED COUNT: 15.9 % (ref 11.5–14.5)
GLOBULIN SER CALC-MCNC: 4 G/DL (ref 2–4)
GLUCOSE SERPL-MCNC: 118 MG/DL (ref 65–100)
GLUCOSE UR STRIP.AUTO-MCNC: NEGATIVE MG/DL
HCT VFR BLD AUTO: 30.3 % (ref 36.6–50.3)
HGB BLD-MCNC: 9.5 G/DL (ref 12.1–17)
HGB UR QL STRIP: NEGATIVE
HYALINE CASTS URNS QL MICRO: NORMAL /LPF (ref 0–5)
IMM GRANULOCYTES # BLD: 0 K/UL (ref 0–0.04)
IMM GRANULOCYTES NFR BLD AUTO: 0 % (ref 0–0.5)
INR PPP: 1 (ref 0.9–1.1)
KETONES UR QL STRIP.AUTO: NEGATIVE MG/DL
LACTATE SERPL-SCNC: 1.4 MMOL/L (ref 0.4–2)
LEUKOCYTE ESTERASE UR QL STRIP.AUTO: NEGATIVE
LYMPHOCYTES # BLD: 2.3 K/UL (ref 0.8–3.5)
LYMPHOCYTES NFR BLD: 22 % (ref 12–49)
MCH RBC QN AUTO: 22 PG (ref 26–34)
MCHC RBC AUTO-ENTMCNC: 31.4 G/DL (ref 30–36.5)
MCV RBC AUTO: 70.1 FL (ref 80–99)
MONOCYTES # BLD: 1.5 K/UL (ref 0–1)
MONOCYTES NFR BLD: 14 % (ref 5–13)
NEUTS SEG # BLD: 6.3 K/UL (ref 1.8–8)
NEUTS SEG NFR BLD: 61 % (ref 32–75)
NITRITE UR QL STRIP.AUTO: NEGATIVE
NRBC # BLD: 0 K/UL (ref 0–0.01)
NRBC BLD-RTO: 0 PER 100 WBC
PH UR STRIP: 6 [PH] (ref 5–8)
PLATELET # BLD AUTO: 274 K/UL (ref 150–400)
PMV BLD AUTO: 9.6 FL (ref 8.9–12.9)
POTASSIUM SERPL-SCNC: 3.7 MMOL/L (ref 3.5–5.1)
PROT SERPL-MCNC: 7.3 G/DL (ref 6.4–8.2)
PROT UR STRIP-MCNC: NEGATIVE MG/DL
PROTHROMBIN TIME: 10.1 SEC (ref 9–11.1)
RBC # BLD AUTO: 4.32 M/UL (ref 4.1–5.7)
RBC #/AREA URNS HPF: NORMAL /HPF (ref 0–5)
SAMPLES BEING HELD,HOLD: NORMAL
SODIUM SERPL-SCNC: 140 MMOL/L (ref 136–145)
SP GR UR REFRACTOMETRY: 1 (ref 1–1.03)
THERAPEUTIC RANGE,PTTT: NORMAL SECS (ref 58–77)
TROPONIN I SERPL-MCNC: <0.05 NG/ML
UR CULT HOLD, URHOLD: NORMAL
UROBILINOGEN UR QL STRIP.AUTO: 0.2 EU/DL (ref 0.2–1)
WBC # BLD AUTO: 10.4 K/UL (ref 4.1–11.1)
WBC URNS QL MICRO: NORMAL /HPF (ref 0–4)

## 2018-12-08 PROCEDURE — 36415 COLL VENOUS BLD VENIPUNCTURE: CPT

## 2018-12-08 PROCEDURE — 87040 BLOOD CULTURE FOR BACTERIA: CPT

## 2018-12-08 PROCEDURE — 74011250636 HC RX REV CODE- 250/636: Performed by: EMERGENCY MEDICINE

## 2018-12-08 PROCEDURE — 81001 URINALYSIS AUTO W/SCOPE: CPT

## 2018-12-08 PROCEDURE — 85610 PROTHROMBIN TIME: CPT

## 2018-12-08 PROCEDURE — 85025 COMPLETE CBC W/AUTO DIFF WBC: CPT

## 2018-12-08 PROCEDURE — 85730 THROMBOPLASTIN TIME PARTIAL: CPT

## 2018-12-08 PROCEDURE — 71275 CT ANGIOGRAPHY CHEST: CPT

## 2018-12-08 PROCEDURE — 74011636320 HC RX REV CODE- 636/320: Performed by: RADIOLOGY

## 2018-12-08 PROCEDURE — 80053 COMPREHEN METABOLIC PANEL: CPT

## 2018-12-08 PROCEDURE — 96360 HYDRATION IV INFUSION INIT: CPT

## 2018-12-08 PROCEDURE — 84484 ASSAY OF TROPONIN QUANT: CPT

## 2018-12-08 PROCEDURE — 99285 EMERGENCY DEPT VISIT HI MDM: CPT

## 2018-12-08 PROCEDURE — 96361 HYDRATE IV INFUSION ADD-ON: CPT

## 2018-12-08 PROCEDURE — 74177 CT ABD & PELVIS W/CONTRAST: CPT

## 2018-12-08 PROCEDURE — 93005 ELECTROCARDIOGRAM TRACING: CPT

## 2018-12-08 PROCEDURE — 83605 ASSAY OF LACTIC ACID: CPT

## 2018-12-08 PROCEDURE — 74011250637 HC RX REV CODE- 250/637: Performed by: STUDENT IN AN ORGANIZED HEALTH CARE EDUCATION/TRAINING PROGRAM

## 2018-12-08 RX ORDER — OXYCODONE AND ACETAMINOPHEN 5; 325 MG/1; MG/1
1 TABLET ORAL ONCE
Status: COMPLETED | OUTPATIENT
Start: 2018-12-08 | End: 2018-12-08

## 2018-12-08 RX ADMIN — OXYCODONE AND ACETAMINOPHEN 1 TABLET: 5; 325 TABLET ORAL at 22:34

## 2018-12-08 RX ADMIN — SODIUM CHLORIDE 1000 ML: 900 INJECTION, SOLUTION INTRAVENOUS at 20:57

## 2018-12-08 RX ADMIN — IOPAMIDOL 100 ML: 755 INJECTION, SOLUTION INTRAVENOUS at 22:00

## 2018-12-09 VITALS
DIASTOLIC BLOOD PRESSURE: 89 MMHG | TEMPERATURE: 99.7 F | OXYGEN SATURATION: 98 % | BODY MASS INDEX: 33.8 KG/M2 | WEIGHT: 198 LBS | SYSTOLIC BLOOD PRESSURE: 138 MMHG | HEART RATE: 92 BPM | RESPIRATION RATE: 19 BRPM | HEIGHT: 64 IN

## 2018-12-09 LAB
ATRIAL RATE: 91 BPM
CALCULATED P AXIS, ECG09: 53 DEGREES
CALCULATED R AXIS, ECG10: 54 DEGREES
CALCULATED T AXIS, ECG11: 4 DEGREES
DIAGNOSIS, 93000: NORMAL
P-R INTERVAL, ECG05: 158 MS
Q-T INTERVAL, ECG07: 334 MS
QRS DURATION, ECG06: 74 MS
QTC CALCULATION (BEZET), ECG08: 410 MS
VENTRICULAR RATE, ECG03: 91 BPM

## 2018-12-09 NOTE — ED NOTES
Provider has reviewed discharge instructions with the patient and spouse. The patient and spouse verbalized understanding.

## 2018-12-09 NOTE — ED TRIAGE NOTES
Pt. States started with fever and abdominal pain, states that was just released from [facility] last night, states had recent biospsy of liver, then developed a hematoma. States noted SOB today and increased swelling in right upper abd.

## 2018-12-09 NOTE — ED PROVIDER NOTES
72 y.o. 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 Pt explains that he was just admitted to [facility] for 4 days where he had a CT abd/pelvis that was significant for a subcapsular hematoma; pt was discharged last night. He also indicates that he was diagnosed with a liver mass before his hospital admission via CT-guided liver mass biopsy. Today, the pt reports RUQ abd pain and a fever that has been gradually increasing from 100.1 F to 101.0 F. He adds that he has been intermittently short of breath for the last week. Pt reports associated abd distension, diaphoresis, chest congestion, and a dry cough. He explains that his abd pain is worse with both coughing and breathing. Pt includes that he has been taking oxycodone with relief of pain to a 1.5/10 in severity. Pt denies any chest pain, LE swelling, or urinary sx currently. There are no other acute medical concerns at this time. Social hx: Patient denies current Tobacco use. Denies EtOH use. Denies illicit drug abuse. PCP: Vaishali Del Cid MD 
 
Note written by Adina Castillo, as dictated by Jonathan Peñaloza MD 8:34 PM 
 
 
 
The history is provided by the patient. No  was used. 71y M here with abd pain and chills. In the past week or so he's been in the hospital after having a subcapsular hematoma of the liver after a biopsy that showed CA. Was having chills and fever in the hospital but did not require surgery. Had been doing ok at home until last night when he started to have sweats. Then had chills today and worsening pain in the RUQ similar to previous. No vomiting. No diarrhea. Nothing makes sx's better or worse. No chest pain.  The pain in the abd is so bad that it \"takes my breath away. \" Sometimes he has to breathe shallow due to abd pain. Past Medical History:  
Diagnosis Date  Back pain Lumbar  Liver lesion Past Surgical History:  
Procedure Laterality Date  HX ORTHOPAEDIC Lower Back Surgery  HX PROSTATECTOMY Family History:  
Problem Relation Age of Onset  Cancer Mother  Cancer Father  Heart Disease Neg Hx Social History Socioeconomic History  Marital status:  Spouse name: Not on file  Number of children: Not on file  Years of education: Not on file  Highest education level: Not on file Social Needs  Financial resource strain: Not on file  Food insecurity - worry: Not on file  Food insecurity - inability: Not on file  Transportation needs - medical: Not on file  Transportation needs - non-medical: Not on file Occupational History  Not on file Tobacco Use  Smoking status: Former Smoker  Smokeless tobacco: Never Used Substance and Sexual Activity  Alcohol use: No  
  Frequency: Never  Drug use: No  
 Sexual activity: Not on file Other Topics Concern  Not on file Social History Narrative  Not on file ALLERGIES: Pcn [penicillins] Review of Systems Constitutional: Positive for diaphoresis and fever. Negative for chills. HENT: Positive for congestion. Negative for sore throat. Eyes: Negative for pain. Respiratory: Positive for cough (dry) and shortness of breath. Cardiovascular: Negative for chest pain and leg swelling. Gastrointestinal: Positive for abdominal distention and abdominal pain (RUQ). Negative for vomiting. Genitourinary: Negative for difficulty urinating, dysuria and hematuria. Skin: Negative for rash. Neurological: Negative for syncope and headaches. Psychiatric/Behavioral: Negative for confusion. All other systems reviewed and are negative. Vitals:  
 12/08/18 2007 BP: (!) 208/91 Pulse: 92 Resp: 19 Temp: 99.7 °F (37.6 °C) SpO2: 99% Weight: 89.8 kg (198 lb) Height: 5' 4\" (1.626 m) Physical Exam  
Constitutional: He is oriented to person, place, and time. He appears well-developed. No distress. HENT:  
Head: Normocephalic and atraumatic. Eyes: Conjunctivae and EOM are normal.  
Neck: Normal range of motion. Neck supple. Cardiovascular: Normal rate, regular rhythm and normal heart sounds. Pulmonary/Chest: Effort normal and breath sounds normal. No respiratory distress. Abdominal: Soft. He exhibits distension (mild). There is tenderness (mild) in the right upper quadrant. There is no guarding. Musculoskeletal: Normal range of motion. He exhibits no edema. Neurological: He is alert and oriented to person, place, and time. He exhibits normal muscle tone. Skin: Skin is warm and dry. Vitals reviewed. Note written by Perla Green, as dictated by Edwin Acosta MD 8:34 PM  
 
MDM Procedures 11:55 PM 
The patient is resting comfortably and feels better, is alert and in no distress. On re-examination the patient does not appear toxic and has no meningeal signs, and there is no intractable vomiting, no respiratory distress and no intractable pain. Based on the history, exam, diagnostic testing (if any) and reassessment, the patient has no signs of meningitis, significant pneumonia, pyelonephritis, cellulitis, sepsis or other acute serious bacterial infections, or other significant pathology to warrant further testing, continued ED treatment, admission or specialist evaluation. The patient's vital signs have been stable. The patient's condition is stable and is appropriate for discharge. The patient or caregiver will pursue further outpatient evaluation with the primary care physician or other designated or consulting physician as indicated in the discharge instructions.

## 2018-12-09 NOTE — DISCHARGE INSTRUCTIONS
Shortness of Breath: Care Instructions  Your Care Instructions  Shortness of breath has many causes. Sometimes conditions such as anxiety can lead to shortness of breath. Some people get mild shortness of breath when they exercise. Trouble breathing also can be a symptom of a serious problem, such as asthma, lung disease, emphysema, heart problems, and pneumonia. If your shortness of breath continues, you may need tests and treatment. Watch for any changes in your breathing and other symptoms. Follow-up care is a key part of your treatment and safety. Be sure to make and go to all appointments, and call your doctor if you are having problems. It's also a good idea to know your test results and keep a list of the medicines you take. How can you care for yourself at home? · Do not smoke or allow others to smoke around you. If you need help quitting, talk to your doctor about stop-smoking programs and medicines. These can increase your chances of quitting for good. · Get plenty of rest and sleep. · Take your medicines exactly as prescribed. Call your doctor if you think you are having a problem with your medicine. · Find healthy ways to deal with stress. ? Exercise daily. ? Get plenty of sleep. ? Eat regularly and well. When should you call for help? Call 911 anytime you think you may need emergency care. For example, call if:    · You have severe shortness of breath.     · You have symptoms of a heart attack. These may include:  ? Chest pain or pressure, or a strange feeling in the chest.  ? Sweating. ? Shortness of breath. ? Nausea or vomiting. ? Pain, pressure, or a strange feeling in the back, neck, jaw, or upper belly or in one or both shoulders or arms. ? Lightheadedness or sudden weakness. ? A fast or irregular heartbeat. After you call 911, the  may tell you to chew 1 adult-strength or 2 to 4 low-dose aspirin. Wait for an ambulance.  Do not try to drive yourself.    Call your doctor now or seek immediate medical care if:    · Your shortness of breath gets worse or you start to wheeze. Wheezing is a high-pitched sound when you breathe.     · You wake up at night out of breath or have to prop your head up on several pillows to breathe.     · You are short of breath after only light activity or while at rest.    Watch closely for changes in your health, and be sure to contact your doctor if:    · You do not get better over the next 1 to 2 days. Where can you learn more? Go to http://marissa-angelo.info/. Enter S780 in the search box to learn more about \"Shortness of Breath: Care Instructions. \"  Current as of: December 6, 2017  Content Version: 11.8  © 8506-8016 [employer]. Care instructions adapted under license by [employer] (which disclaims liability or warranty for this information). If you have questions about a medical condition or this instruction, always ask your healthcare professional. [place] [address] any warranty or liability for your use of this information. Learning About Fever  What is a fever? A fever is a high body temperature. It's one way your body fights being sick. A fever shows that the body is responding to infection or other illnesses, both minor and severe. A fever is a symptom, not an illness by itself. A fever can be a sign that you are ill, but most fevers are not caused by a serious problem. You may have a fever with a minor illness, such as a cold. But sometimes a very serious infection may cause little or no fever. It is important to look at other symptoms, other conditions you have, and how you feel in general. In children, notice how they act and see what symptoms they complain of. What is a normal body temperature? A normal body temperature is about 98. 6ºF. Some people have a normal temperature that is a little higher or a little lower than this.   Your temperature may be a little lower in the morning than it is later in the day. It may go up during hot weather or when you exercise, wear heavy clothes, or take a hot bath. Your temperature may also be different depending on how you take it. A temperature taken in the mouth (oral) or under the arm may be a little lower than your core temperature (rectal). What is a fever temperature? A core temperature of 100.4°F or above is considered a fever. What can cause a fever? A fever may be caused by:  · Infections. This is the most common cause of a fever. Examples of infections that can cause a fever include the flu, a kidney infection, or pneumonia. · Some medicines. · Severe trauma or injury, such as a heart attack, stroke, heatstroke, or burns. · Other medical conditions, such as arthritis and some cancers. How can you treat a fever at home? · Ask your doctor if you can take an over-the-counter pain medicine, such as acetaminophen (Tylenol), ibuprofen (Advil, Motrin), or naproxen (Aleve). Be safe with medicines. Read and follow all instructions on the label. · To prevent dehydration, drink plenty of fluids. Choose water and other caffeine-free clear liquids until you feel better. If you have kidney, heart, or liver disease and have to limit fluids, talk with your doctor before you increase the amount of fluids you drink. Follow-up care is a key part of your treatment and safety. Be sure to make and go to all appointments, and call your doctor if you are having problems. It's also a good idea to know your test results and keep a list of the medicines you take. Where can you learn more? Go to http://marissa-angelo.info/. Enter F313 in the search box to learn more about \"Learning About Fever. \"  Current as of: November 20, 2017  Content Version: 11.8  © 2803-9632 Health[employer], [employer].  Care instructions adapted under license by [facility] (which disclaims liability or warranty for this information). If you have questions about a medical condition or this instruction, always ask your healthcare professional. [place] [address] any warranty or liability for your use of this information.

## 2018-12-10 LAB
BACTERIA SPEC CULT: NORMAL
SERVICE CMNT-IMP: NORMAL

## 2018-12-13 LAB
BACTERIA SPEC CULT: NORMAL
SERVICE CMNT-IMP: NORMAL

## 2020-08-05 PROCEDURE — 93005 ELECTROCARDIOGRAM TRACING: CPT

## 2020-08-05 PROCEDURE — 99284 EMERGENCY DEPT VISIT MOD MDM: CPT

## 2020-08-06 ENCOUNTER — APPOINTMENT (OUTPATIENT)
Dept: CT IMAGING | Age: 75
End: 2020-08-06
Attending: EMERGENCY MEDICINE
Payer: MEDICARE

## 2020-08-06 ENCOUNTER — HOSPITAL ENCOUNTER (EMERGENCY)
Age: 75
Discharge: HOME | End: 2020-08-06
Attending: EMERGENCY MEDICINE
Payer: MEDICARE

## 2020-08-06 ENCOUNTER — HOSPITAL ENCOUNTER (OUTPATIENT)
Dept: GENERAL RADIOLOGY | Age: 75
Discharge: HOME | End: 2020-08-06
Attending: EMERGENCY MEDICINE

## 2020-08-06 VITALS
RESPIRATION RATE: 15 BRPM | SYSTOLIC BLOOD PRESSURE: 141 MMHG | OXYGEN SATURATION: 98 % | TEMPERATURE: 98 F | WEIGHT: 195.99 LBS | HEIGHT: 65 IN | DIASTOLIC BLOOD PRESSURE: 85 MMHG | BODY MASS INDEX: 32.65 KG/M2 | HEART RATE: 61 BPM

## 2020-08-06 DIAGNOSIS — R05.8 COUGH WITH EXPOSURE TO COVID-19 VIRUS: ICD-10-CM

## 2020-08-06 DIAGNOSIS — Z20.822 COUGH WITH EXPOSURE TO COVID-19 VIRUS: ICD-10-CM

## 2020-08-06 DIAGNOSIS — R05.9 COUGH: Primary | ICD-10-CM

## 2020-08-06 LAB
ALBUMIN SERPL-MCNC: 3.8 G/DL (ref 3.5–5)
ALBUMIN/GLOB SERPL: 1 {RATIO} (ref 1.1–2.2)
ALP SERPL-CCNC: 137 U/L (ref 45–117)
ALT SERPL-CCNC: 25 U/L (ref 12–78)
ANION GAP SERPL CALC-SCNC: 6 MMOL/L (ref 5–15)
AST SERPL-CCNC: 21 U/L (ref 15–37)
ATRIAL RATE: 78 BPM
BASOPHILS # BLD: 0 K/UL (ref 0–0.1)
BASOPHILS NFR BLD: 0 % (ref 0–1)
BILIRUB SERPL-MCNC: 0.2 MG/DL (ref 0.2–1)
BNP SERPL-MCNC: 29 PG/ML
BUN SERPL-MCNC: 8 MG/DL (ref 6–20)
BUN/CREAT SERPL: 6 (ref 12–20)
CALCIUM SERPL-MCNC: 8.9 MG/DL (ref 8.5–10.1)
CALCULATED P AXIS, ECG09: 60 DEGREES
CALCULATED R AXIS, ECG10: 70 DEGREES
CALCULATED T AXIS, ECG11: 31 DEGREES
CHLORIDE SERPL-SCNC: 104 MMOL/L (ref 97–108)
CO2 SERPL-SCNC: 28 MMOL/L (ref 21–32)
COMMENT, HOLDF: NORMAL
COVID-19, XGCOVT: NOT DETECTED
CREAT SERPL-MCNC: 1.28 MG/DL (ref 0.7–1.3)
DIAGNOSIS, 93000: NORMAL
DIFFERENTIAL METHOD BLD: ABNORMAL
EOSINOPHIL # BLD: 0.2 K/UL (ref 0–0.4)
EOSINOPHIL NFR BLD: 2 % (ref 0–7)
ERYTHROCYTE [DISTWIDTH] IN BLOOD BY AUTOMATED COUNT: 16.4 % (ref 11.5–14.5)
GLOBULIN SER CALC-MCNC: 3.8 G/DL (ref 2–4)
GLUCOSE SERPL-MCNC: 122 MG/DL (ref 65–100)
HCT VFR BLD AUTO: 38.5 % (ref 36.6–50.3)
HEALTH STATUS, XMCV2T: NORMAL
HGB BLD-MCNC: 11.8 G/DL (ref 12.1–17)
IMM GRANULOCYTES # BLD AUTO: 0 K/UL (ref 0–0.04)
IMM GRANULOCYTES NFR BLD AUTO: 0 % (ref 0–0.5)
LYMPHOCYTES # BLD: 2.4 K/UL (ref 0.8–3.5)
LYMPHOCYTES NFR BLD: 28 % (ref 12–49)
MCH RBC QN AUTO: 21.5 PG (ref 26–34)
MCHC RBC AUTO-ENTMCNC: 30.6 G/DL (ref 30–36.5)
MCV RBC AUTO: 70 FL (ref 80–99)
MONOCYTES # BLD: 0.8 K/UL (ref 0–1)
MONOCYTES NFR BLD: 9 % (ref 5–13)
NEUTS SEG # BLD: 5.3 K/UL (ref 1.8–8)
NEUTS SEG NFR BLD: 61 % (ref 32–75)
NRBC # BLD: 0 K/UL (ref 0–0.01)
NRBC BLD-RTO: 0 PER 100 WBC
P-R INTERVAL, ECG05: 198 MS
PLATELET # BLD AUTO: 276 K/UL (ref 150–400)
PMV BLD AUTO: 8.8 FL (ref 8.9–12.9)
POTASSIUM SERPL-SCNC: 4 MMOL/L (ref 3.5–5.1)
PROT SERPL-MCNC: 7.6 G/DL (ref 6.4–8.2)
Q-T INTERVAL, ECG07: 376 MS
QRS DURATION, ECG06: 86 MS
QTC CALCULATION (BEZET), ECG08: 428 MS
RBC # BLD AUTO: 5.5 M/UL (ref 4.1–5.7)
SAMPLES BEING HELD,HOLD: NORMAL
SODIUM SERPL-SCNC: 138 MMOL/L (ref 136–145)
SOURCE, COVRS: NORMAL
SPECIMEN SOURCE, FCOV2M: NORMAL
SPECIMEN TYPE, XMCV1T: NORMAL
TROPONIN I SERPL-MCNC: <0.05 NG/ML
VENTRICULAR RATE, ECG03: 78 BPM
WBC # BLD AUTO: 8.7 K/UL (ref 4.1–11.1)

## 2020-08-06 PROCEDURE — 80053 COMPREHEN METABOLIC PANEL: CPT

## 2020-08-06 PROCEDURE — 74011000258 HC RX REV CODE- 258: Performed by: RADIOLOGY

## 2020-08-06 PROCEDURE — 94640 AIRWAY INHALATION TREATMENT: CPT

## 2020-08-06 PROCEDURE — 87635 SARS-COV-2 COVID-19 AMP PRB: CPT

## 2020-08-06 PROCEDURE — 71046 X-RAY EXAM CHEST 2 VIEWS: CPT

## 2020-08-06 PROCEDURE — 74011250637 HC RX REV CODE- 250/637: Performed by: EMERGENCY MEDICINE

## 2020-08-06 PROCEDURE — 71275 CT ANGIOGRAPHY CHEST: CPT

## 2020-08-06 PROCEDURE — 85025 COMPLETE CBC W/AUTO DIFF WBC: CPT

## 2020-08-06 PROCEDURE — 94664 DEMO&/EVAL PT USE INHALER: CPT

## 2020-08-06 PROCEDURE — 74011636320 HC RX REV CODE- 636/320: Performed by: RADIOLOGY

## 2020-08-06 PROCEDURE — 36415 COLL VENOUS BLD VENIPUNCTURE: CPT

## 2020-08-06 PROCEDURE — 83880 ASSAY OF NATRIURETIC PEPTIDE: CPT

## 2020-08-06 PROCEDURE — 84484 ASSAY OF TROPONIN QUANT: CPT

## 2020-08-06 RX ORDER — INHALER, ASSIST DEVICES
1 SPACER (EA) MISCELLANEOUS AS NEEDED
Qty: 1 DEVICE | Refills: 0 | Status: SHIPPED | OUTPATIENT
Start: 2020-08-06

## 2020-08-06 RX ORDER — ALBUTEROL SULFATE 90 UG/1
2 AEROSOL, METERED RESPIRATORY (INHALATION)
Qty: 1 INHALER | Refills: 0 | Status: SHIPPED | OUTPATIENT
Start: 2020-08-06

## 2020-08-06 RX ORDER — SODIUM CHLORIDE 0.9 % (FLUSH) 0.9 %
10 SYRINGE (ML) INJECTION
Status: COMPLETED | OUTPATIENT
Start: 2020-08-06 | End: 2020-08-06

## 2020-08-06 RX ORDER — ALBUTEROL SULFATE 90 UG/1
2 AEROSOL, METERED RESPIRATORY (INHALATION) ONCE
Status: COMPLETED | OUTPATIENT
Start: 2020-08-06 | End: 2020-08-06

## 2020-08-06 RX ORDER — AZITHROMYCIN 250 MG/1
TABLET, FILM COATED ORAL
Qty: 6 TAB | Refills: 0 | Status: SHIPPED | OUTPATIENT
Start: 2020-08-06 | End: 2020-08-11

## 2020-08-06 RX ADMIN — Medication 10 ML: at 01:42

## 2020-08-06 RX ADMIN — ALBUTEROL SULFATE 2 PUFF: 90 AEROSOL, METERED RESPIRATORY (INHALATION) at 03:19

## 2020-08-06 RX ADMIN — IOPAMIDOL 80 ML: 755 INJECTION, SOLUTION INTRAVENOUS at 01:43

## 2020-08-06 RX ADMIN — SODIUM CHLORIDE 100 ML: 900 INJECTION, SOLUTION INTRAVENOUS at 01:42

## 2020-08-06 NOTE — ED TRIAGE NOTES
Arrives with cc of SOB, mid upper chest pressure and cold like symptoms that started 2-3 weeks ago that's worsening today. States he now feels the pain in his back. Denies exposure to Covid 19. Pt is a .      Being monitored for liver cancer    Oncologist: Ruddy Akbar

## 2020-08-06 NOTE — DISCHARGE INSTRUCTIONS
Patient Education      Work-up in the emergency room has been reassuring tonight. CAT scan of your chest did not show any evidence of blood clot or pneumonia. Your EKG and blood work for your heart also came back normal.  I am starting you on azithromycin, an antibiotic, given how long you have had your cough. Use the inhaler with the spacer as directed here. We are also sending a COVID-19 test to rule out coronavirus. The test should come back within the next couple of days and you will be contacted if positive. You can also check my chart for results. Is important that you follow-up with your primary care doctor for further evaluation of your symptoms. It is okay to take Tylenol for your pain. If you develop a fever, chest pain with increased shortness of breath, nausea, sweating, increased leg swelling, etc.  Return to the emergency department  Cough: Care Instructions  Your Care Instructions     A cough is your body's response to something that bothers your throat or airways. Many things can cause a cough. You might cough because of a cold or the flu, bronchitis, or asthma. Smoking, postnasal drip, allergies, and stomach acid that backs up into your throat also can cause coughs. A cough is a symptom, not a disease. Most coughs stop when the cause, such as a cold, goes away. You can take a few steps at home to cough less and feel better. Follow-up care is a key part of your treatment and safety. Be sure to make and go to all appointments, and call your doctor if you are having problems. It's also a good idea to know your test results and keep a list of the medicines you take. How can you care for yourself at home? · Drink lots of water and other fluids. This helps thin the mucus and soothes a dry or sore throat. Honey or lemon juice in hot water or tea may ease a dry cough. · Take cough medicine as directed by your doctor.   · Prop up your head on pillows to help you breathe and ease a dry cough.  · Try cough drops to soothe a dry or sore throat. Cough drops don't stop a cough. Medicine-flavored cough drops are no better than candy-flavored drops or hard candy. · Do not smoke. Avoid secondhand smoke. If you need help quitting, talk to your doctor about stop-smoking programs and medicines. These can increase your chances of quitting for good. When should you call for help? [id number] anytime you think you may need emergency care. For example, call if:  · You have severe trouble breathing. Call your doctor now or seek immediate medical care if:  · You cough up blood. · You have new or worse trouble breathing. · You have a new or higher fever. · You have a new rash. Watch closely for changes in your health, and be sure to contact your doctor if:  · You cough more deeply or more often, especially if you notice more mucus or a change in the color of your mucus. · You have new symptoms, such as a sore throat, an earache, or sinus pain. · You do not get better as expected. Where can you learn more? Go to http://marissaDGITangelo.info/  Enter D279 in the search box to learn more about \"Cough: Care Instructions. \"  Current as of: February 24, 2020               Content Version: 12.5  © 5205-9775 Healthwise, [employer]. Care instructions adapted under license by [employer] (which disclaims liability or warranty for this information). If you have questions about a medical condition or this instruction, always ask your healthcare professional. [place] [address] any warranty or liability for your use of this information. Patient Education   Learning About Coronavirus [phone])  Coronavirus [phone]): Overview  What is coronavirus ([id number])? The coronavirus disease (COVID-19) is caused by a virus. It is an illness that was first found in [place], [place], in December 2019. It has since spread worldwide.   The virus can cause fever, cough, and trouble breathing. In severe cases, it can cause pneumonia and make it hard to breathe without help. It can cause death. Coronaviruses are a large group of viruses. They cause the common cold. They also cause more serious illnesses like Middle East respiratory syndrome (MERS) and severe acute respiratory syndrome (SARS). COVID-19 is caused by a novel coronavirus. That means it's a new type that has not been seen in people before. This virus spreads person-to-person through droplets from coughing and sneezing. It can also spread when you are close to someone who is infected. And it can spread when you touch something that has the virus on it, such as a doorknob or a tabletop. What can you do to protect yourself from coronavirus (COVID-19)? The best way to protect yourself from getting sick is to:  · Avoid areas where there is an outbreak. · Avoid contact with people who may be infected. · Wash your hands often with soap or alcohol-based hand sanitizers. · Avoid crowds and try to stay at least 6 feet away from other people. · Wash your hands often, especially after you cough or sneeze. Use soap and water, and scrub for at least 20 seconds. If soap and water aren't available, use an alcohol-based hand . · Avoid touching your mouth, nose, and eyes. What can you do to avoid spreading the virus to others? To help avoid spreading the virus to others:  · Cover your mouth with a tissue when you cough or sneeze. Then throw the tissue in the trash. · Use a disinfectant to clean things that you touch often. · Stay home if you are sick or have been exposed to the virus. Don't go to school, work, or public areas. And don't use public transportation. · If you are sick:  ? Leave your home only if you need to get medical care. But call the doctor's office first so they know you're coming. And wear a face mask, if you have one.  ? If you have a face mask, wear it whenever you're around other people.  It can help stop the spread of the virus when you cough or sneeze. ? Clean and disinfect your home every day. Use household  and disinfectant wipes or sprays. Take special care to clean things that you grab with your hands. These include doorknobs, remote controls, phones, and handles on your refrigerator and microwave. And don't forget countertops, tabletops, bathrooms, and computer keyboards. When to call for help  Call 911 anytime you think you may need emergency care. For example, call if:  · You have severe trouble breathing. (You can't talk at all.)  · You have constant chest pain or pressure. · You are severely dizzy or lightheaded. · You are confused or can't think clearly. · Your face and lips have a blue color. · You pass out (lose consciousness) or are very hard to wake up. Call your doctor now if you develop symptoms such as:  · Shortness of breath. · Fever. · Cough. If you need to get care, call ahead to the doctor's office for instructions before you go. Make sure you wear a face mask, if you have one, to prevent exposing other people to the virus. Where can you get the latest information? The following health organizations are tracking and studying this virus. Their websites contain the most up-to-date information. Candy Acosta also learn what to do if you think you may have been exposed to the virus. · U.S. Centers for Disease Control and Prevention (CDC): The CDC provides updated news about the disease and travel advice. The website also tells you how to prevent the spread of infection. www.cdc.gov  · World Health Organization [facility]): WHO offers information about the virus outbreaks. WHO also has travel advice. www.who.int  Current as of: April 1, 2020               Content Version: 12.4  © 2006-2020 Healthwise, Incorporated.    Care instructions adapted under license by your healthcare professional. If you have questions about a medical condition or this instruction, always ask your healthcare professional. [address] any warranty or liability for your use of this information. Patient Education        Shortness of Breath: Care Instructions  Your Care Instructions  Shortness of breath has many causes. Sometimes conditions such as anxiety can lead to shortness of breath. Some people get mild shortness of breath when they exercise. Trouble breathing also can be a symptom of a serious problem, such as asthma, lung disease, emphysema, heart problems, and pneumonia. If your shortness of breath continues, you may need tests and treatment. Watch for any changes in your breathing and other symptoms. Follow-up care is a key part of your treatment and safety. Be sure to make and go to all appointments, and call your doctor if you are having problems. It's also a good idea to know your test results and keep a list of the medicines you take. How can you care for yourself at home? · Do not smoke or allow others to smoke around you. If you need help quitting, talk to your doctor about stop-smoking programs and medicines. These can increase your chances of quitting for good. · Get plenty of rest and sleep. · Take your medicines exactly as prescribed. Call your doctor if you think you are having a problem with your medicine. · Find healthy ways to deal with stress. ? Exercise daily. ? Get plenty of sleep. ? Eat regularly and well. When should you call for help? [id number] anytime you think you may need emergency care. For example, call if:  · You have severe shortness of breath. · You have symptoms of a heart attack. These may include:  ? Chest pain or pressure, or a strange feeling in the chest.  ? Sweating. ? Shortness of breath. ? Nausea or vomiting. ? Pain, pressure, or a strange feeling in the back, neck, jaw, or upper belly or in one or both shoulders or arms. ? Lightheadedness or sudden weakness. ? A fast or irregular heartbeat.   After you call 911, the  may tell you to chew 1 adult-strength or 2 to 4 low-dose aspirin. Wait for an ambulance. Do not try to drive yourself. Call your doctor now or seek immediate medical care if:  · Your shortness of breath gets worse or you start to wheeze. Wheezing is a high-pitched sound when you breathe. · You wake up at night out of breath or have to prop your head up on several pillows to breathe. · You are short of breath after only light activity or while at rest.  Watch closely for changes in your health, and be sure to contact your doctor if:  · You do not get better over the next 1 to 2 days. Where can you learn more? Go to http://marissa-angelo.info/  Enter S780 in the search box to learn more about \"Shortness of Breath: Care Instructions. \"  Current as of: February 24, 2020               Content Version: 12.5  © 5399-8076 Healthwise, Incorporated. Care instructions adapted under license by [employer] (which disclaims liability or warranty for this information). If you have questions about a medical condition or this instruction, always ask your healthcare professional. [place] [address] any warranty or liability for your use of this information.

## 2020-08-06 NOTE — ED PROVIDER NOTES
HPI     Pleasant 77-year-old male with a history of liver cancer,( no chemotherapy, and stable per Dr. Martin Harman) thyroid disease, presents the emergency department complaining of 1 month of shortness of breath and cough. He denies fever. He is a . He does have some pain in his right leg and bilateral feet swelling. He has never had a blood clot before. He states at one point he had some right-sided chest pain but that resolved and now he has pain in his right shoulder blade. It is worse with movement is also worse with deep breathing. He does have a change in taste but that is been ongoing for years. He has a little sore throat today. 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 He was tested twice early on but does not had a test since his shortness of breath and cough developed. Patient does not have high blood pressure or diabetes, does not smoke and has no family history of heart disease.     Past Medical History:   Diagnosis Date    Back pain     Lumbar    Liver lesion        Past Surgical History:   Procedure Laterality Date    HX ORTHOPAEDIC      Lower Back Surgery    HX PROSTATECTOMY           Family History:   Problem Relation Age of Onset    Cancer Mother     Cancer Father     Heart Disease Neg Hx        Social History     Socioeconomic History    Marital status:      Spouse name: Not on file    Number of children: Not on file    Years of education: Not on file    Highest education level: Not on file   Occupational History    Not on file   Social Needs    Financial resource strain: Not on file    Food insecurity     Worry: Not on file     Inability: Not on file    Transportation needs     Medical: Not on file     Non-medical: Not on file   Tobacco Use    Smoking status: Former Smoker    Smokeless tobacco: Never Used   Substance and Sexual Activity    Alcohol use: Not Currently     Frequency: Never  Drug use: No    Sexual activity: Not on file   Lifestyle    Physical activity     Days per week: Not on file     Minutes per session: Not on file    Stress: Not on file   Relationships    Social connections     Talks on phone: Not on file     Gets together: Not on file     Attends Protestant service: Not on file     Active member of club or organization: Not on file     Attends meetings of clubs or organizations: Not on file     Relationship status: Not on file    Intimate partner violence     Fear of current or ex partner: Not on file     Emotionally abused: Not on file     Physically abused: Not on file     Forced sexual activity: Not on file   Other Topics Concern    Not on file   Social History Narrative    Not on file         ALLERGIES: Pcn [penicillins]    Review of Systems   Constitutional: Negative for fever. HENT: Positive for congestion. Respiratory: Positive for cough. Cardiovascular: Positive for leg swelling. Negative for chest pain. Gastrointestinal: Negative for abdominal pain, nausea and vomiting. Endocrine: Negative for polyuria. Genitourinary: Negative for dysuria. Musculoskeletal: Negative for gait problem. Skin: Negative for rash. Neurological: Negative for headaches. Psychiatric/Behavioral: Negative for dysphoric mood. Vitals:    08/05/20 2321   BP: 156/78   Pulse: 75   Resp: 18   Temp: 98.4 °F (36.9 °C)   SpO2: 98%   Weight: 88.9 kg (195 lb 15.8 oz)   Height: 5' 5\" (1.651 m)            Physical Exam  Constitutional:       General: He is not in acute distress. Appearance: He is well-developed. HENT:      Head: Normocephalic and atraumatic. Mouth/Throat:      Pharynx: No oropharyngeal exudate. Eyes:      General: No scleral icterus. Right eye: No discharge. Left eye: No discharge. Pupils: Pupils are equal, round, and reactive to light. Neck:      Musculoskeletal: Normal range of motion and neck supple. Vascular: No JVD. Cardiovascular:      Rate and Rhythm: Normal rate and regular rhythm. Heart sounds: Normal heart sounds. No murmur. Pulmonary:      Effort: Pulmonary effort is normal. No respiratory distress. Breath sounds: Normal breath sounds. No stridor. No wheezing or rales. Chest:      Chest wall: No tenderness. Abdominal:      General: Bowel sounds are normal. There is no distension. Palpations: Abdomen is soft. There is no mass. Tenderness: There is no abdominal tenderness. There is no guarding or rebound. Musculoskeletal: Normal range of motion. Skin:     General: Skin is warm and dry. Capillary Refill: Capillary refill takes less than 2 seconds. Findings: No rash. Neurological:      Mental Status: He is oriented to person, place, and time. Psychiatric:         Behavior: Behavior normal.         Thought Content: Thought content normal.         Judgment: Judgment normal.          MDM       Procedures      ED EKG interpretation:  Rhythm: normal sinus rhythm; and regular . Rate (approx.): 78; Axis: normal; P wave: normal; QRS interval: normal ; ST/T wave: non-specific changes; This EKG was interpreted by Arin Schmid MD,ED Provider. Work-up in the ED is reassuring. Negative CTA for PE. Known liver cancer. He has not hypoxic or tachypneic. Troponin and BNP are negative. He does have a history of reactive airway disease with bronchitis has an inhaler at home but no spacer. Will start Zithromax given 1 month of cough, continue the inhaler with spacer. I am sending a COVID test given his work exposure and symptoms. He was instructed to self isolate until results are back. He was instructed to follow-up with his primary care doctor if symptoms are not improving. Return precautions were provided.

## 2020-08-06 NOTE — PROGRESS NOTES
Pt in covid isolation rule out, awaiting inhaler from pharmacy    4130: inhaler given, left inhaler and spacer in room with pt

## 2020-08-07 ENCOUNTER — PATIENT OUTREACH (OUTPATIENT)
Dept: FAMILY MEDICINE CLINIC | Age: 75
End: 2020-08-07

## 2020-08-07 NOTE — PROGRESS NOTES
Patient contacted regarding recent discharge and COVID-19 risk. Discussed COVID-19 related testing which was available at this time. Test results were negative. Patient informed of results, if available? yes    Care Transition Nurse/ Ambulatory Care Manager contacted the patient by telephone to perform post discharge assessment. Verified name and  with patient as identifiers. Patient has following risk factors of: no known risk factors. CTN/ACM reviewed discharge instructions, medical action plan and red flags related to discharge diagnosis. Reviewed and educated them on any new and changed medications related to discharge diagnosis. Advised obtaining a 90-day supply of all daily and as-needed medications. Advance Care Planning:   Does patient have an Advance Directive: not on file     Education provided regarding infection prevention, and signs and symptoms of COVID-19 and when to seek medical attention with patient who verbalized understanding. Discussed exposure protocols and quarantine from [address] you at higher risk for severe illness  and given an opportunity for questions and concerns. The patient agrees to contact the COVID-19 hotline [phone] or PCP office for questions related to their healthcare. CTN/ACM provided contact information for future reference. From CDC: Are you at higher risk for severe illness?  Wash your hands often.  Avoid close contact (6 feet, which is about two arm lengths) with people who are sick.  Put distance between yourself and other people if COVID-19 is spreading in your community.  Clean and disinfect frequently touched surfaces.  Avoid all cruise travel and non-essential air travel.  Call your healthcare professional if you have concerns about COVID-19 and your underlying condition or if you are sick.     For more information on steps you can take to protect yourself, see CDC's How to Protect Yourself      Patient/family/caregiver given information for [facility] and agrees to enroll no  Patient's preferred e-mail:  n/a  Patient's preferred phone number: n/a  Based on Loop alert triggers, patient will be contacted by nurse care manager for worsening symptoms. Plan for follow-up call in 7-14 days based on severity of symptoms and risk factors.

## 2020-08-13 VITALS
HEART RATE: 73 BPM | DIASTOLIC BLOOD PRESSURE: 70 MMHG | HEIGHT: 65 IN | WEIGHT: 197 LBS | OXYGEN SATURATION: 98 % | SYSTOLIC BLOOD PRESSURE: 128 MMHG | BODY MASS INDEX: 32.82 KG/M2

## 2020-08-13 PROBLEM — R43.2 LOSS OF TASTE: Status: ACTIVE | Noted: 2018-01-25

## 2020-08-13 PROBLEM — R22.1 MASS OF NECK: Status: ACTIVE | Noted: 2020-08-13

## 2020-08-13 PROBLEM — R43.0 LOSS OF SENSE OF SMELL: Status: ACTIVE | Noted: 2018-01-25

## 2020-08-28 ENCOUNTER — OFFICE VISIT (OUTPATIENT)
Dept: ENT CLINIC | Age: 75
End: 2020-08-28
Payer: MEDICARE

## 2020-08-28 VITALS
OXYGEN SATURATION: 97 % | BODY MASS INDEX: 33.02 KG/M2 | TEMPERATURE: 97.7 F | WEIGHT: 198.2 LBS | HEART RATE: 70 BPM | DIASTOLIC BLOOD PRESSURE: 84 MMHG | SYSTOLIC BLOOD PRESSURE: 150 MMHG | HEIGHT: 65 IN

## 2020-08-28 DIAGNOSIS — E04.2 MULTIPLE THYROID NODULES: Primary | ICD-10-CM

## 2020-08-28 PROCEDURE — 99213 OFFICE O/P EST LOW 20 MIN: CPT | Performed by: OTOLARYNGOLOGY

## 2020-08-28 NOTE — PROGRESS NOTES
Subjective:    Jose Roberto Leal [facility]   69 y.o.   1945     The patient  complains of feeling of fullness sensation in his neck with flexion. Patient has a history of enlarged thyroid gland with bilateral thyroid complex nodules. At his previous visit I recommended patient undergo fine-needle aspiration with ultrasound guidance. Review of Systems  Review of Systems   Constitutional: Negative. HENT: Negative. Eyes: Negative. Respiratory: Negative. Cardiovascular: Negative. Gastrointestinal: Negative. Musculoskeletal: Negative. Skin: Negative. Neurological: Negative. Endo/Heme/Allergies: Negative. Psychiatric/Behavioral: Negative. Objective:     Visit Vitals  /84 (BP 1 Location: Left arm, BP Patient Position: Sitting)   Pulse 70   Temp 97.7 °F (36.5 °C)   Ht 5' 5\" (1.651 m)   Wt 198 lb 3.2 oz (89.9 kg)   SpO2 97%   BMI 32.98 kg/m²      Physical Exam  HENT:      Mouth/Throat:      Lips: Pink. Mouth: Mucous membranes are moist.      Pharynx: Oropharynx is clear. Uvula midline. Neck:      Thyroid: Thyromegaly present. Neurological:      Mental Status: He is alert and oriented to person, place, and time. Cranial Nerves: Cranial nerves are intact. Assessment/Plan:     Encounter Diagnoses   Name Primary?     Multiple thyroid nodules Yes        Orders Placed This Encounter    US GUIDE FINE NDL ASP THYROID     Follow-up and Dispositions

## 2020-09-04 NOTE — PROGRESS NOTES
Faxed the order along with a fax sheet to [facility] scheduling on 9/4/2020 at 4:11pm

## 2020-09-30 ENCOUNTER — HOSPITAL ENCOUNTER (OUTPATIENT)
Dept: INTERVENTIONAL RADIOLOGY/VASCULAR | Age: 75
Discharge: HOME | End: 2020-09-30
Attending: OTOLARYNGOLOGY

## 2020-09-30 ENCOUNTER — TELEPHONE (OUTPATIENT)
Dept: ENT CLINIC | Age: 75
End: 2020-09-30

## 2021-02-17 ENCOUNTER — TRANSCRIBE ORDER (OUTPATIENT)
Dept: GENERAL RADIOLOGY | Age: 76
End: 2021-02-17

## 2021-02-17 ENCOUNTER — HOSPITAL ENCOUNTER (OUTPATIENT)
Dept: GENERAL RADIOLOGY | Age: 76
Discharge: HOME | End: 2021-02-17
Attending: INTERNAL MEDICINE
Payer: MEDICARE

## 2021-02-17 DIAGNOSIS — R05.9 COUGH: ICD-10-CM

## 2021-02-17 DIAGNOSIS — R05.9 COUGH: Primary | ICD-10-CM

## 2021-02-17 PROCEDURE — 71046 X-RAY EXAM CHEST 2 VIEWS: CPT

## 2021-03-12 ENCOUNTER — TRANSCRIBE ORDER (OUTPATIENT)
Dept: GENERAL RADIOLOGY | Age: 76
End: 2021-03-12

## 2021-03-12 DIAGNOSIS — R06.02 SHORTNESS OF BREATH: Primary | ICD-10-CM

## 2021-03-19 ENCOUNTER — HOSPITAL ENCOUNTER (OUTPATIENT)
Dept: GENERAL RADIOLOGY | Age: 76
Discharge: HOME | End: 2021-03-19
Payer: MEDICARE

## 2021-03-19 DIAGNOSIS — R06.02 SHORTNESS OF BREATH: ICD-10-CM

## 2021-03-19 PROCEDURE — 71046 X-RAY EXAM CHEST 2 VIEWS: CPT | Performed by: FAMILY MEDICINE

## 2021-11-07 ENCOUNTER — TRANSCRIBE ORDER (OUTPATIENT)
Dept: SCHEDULING | Age: 76
End: 2021-11-07

## 2021-11-07 DIAGNOSIS — D34 THYROID ADENOMA: Primary | ICD-10-CM

## 2021-11-11 ENCOUNTER — HOSPITAL ENCOUNTER (OUTPATIENT)
Dept: ULTRASOUND IMAGING | Age: 76
Discharge: HOME | End: 2021-11-11
Attending: OTOLARYNGOLOGY
Payer: MEDICARE

## 2021-11-11 DIAGNOSIS — D34 THYROID ADENOMA: ICD-10-CM

## 2021-11-11 PROCEDURE — 76536 US EXAM OF HEAD AND NECK: CPT

## 2021-11-16 ENCOUNTER — TRANSCRIBE ORDER (OUTPATIENT)
Dept: SCHEDULING | Age: 76
End: 2021-11-16

## 2021-11-16 DIAGNOSIS — D34 THYROID ADENOMA: Primary | ICD-10-CM

## 2021-11-16 DIAGNOSIS — E04.1 THYROID NODULE: Primary | ICD-10-CM

## 2021-12-27 ENCOUNTER — HOSPITAL ENCOUNTER (OUTPATIENT)
Dept: ULTRASOUND IMAGING | Age: 76
Discharge: HOME | End: 2021-12-27
Attending: OTOLARYNGOLOGY
Payer: MEDICARE

## 2021-12-27 DIAGNOSIS — D34 THYROID ADENOMA: ICD-10-CM

## 2021-12-27 PROCEDURE — 10006 FNA BX W/US GDN EA ADDL: CPT

## 2021-12-27 PROCEDURE — 10005 FNA BX W/US GDN 1ST LES: CPT

## 2021-12-27 PROCEDURE — 88172 CYTP DX EVAL FNA 1ST EA SITE: CPT

## 2021-12-27 PROCEDURE — 77030014115

## 2021-12-27 PROCEDURE — 88173 CYTOPATH EVAL FNA REPORT: CPT

## 2021-12-27 RX ORDER — SODIUM BICARBONATE 42 MG/ML
1 INJECTION, SOLUTION INTRAVENOUS
Status: DISCONTINUED | OUTPATIENT
Start: 2021-12-27 | End: 2021-12-28 | Stop reason: HOSPADM

## 2021-12-27 RX ORDER — LIDOCAINE HYDROCHLORIDE 10 MG/ML
10 INJECTION, SOLUTION EPIDURAL; INFILTRATION; INTRACAUDAL; PERINEURAL
Status: COMPLETED | OUTPATIENT
Start: 2021-12-27 | End: 2021-12-27

## 2021-12-27 RX ADMIN — LIDOCAINE HYDROCHLORIDE 5 ML: 10 INJECTION, SOLUTION EPIDURAL; INFILTRATION; INTRACAUDAL; PERINEURAL at 14:26

## 2022-03-18 PROBLEM — K76.89 SUBCAPSULAR HEPATIC HEMATOMA: Status: ACTIVE | Noted: 2018-12-04

## 2022-03-18 PROBLEM — R43.0 LOSS OF SENSE OF SMELL: Status: ACTIVE | Noted: 2018-01-25

## 2022-03-19 PROBLEM — R43.2 LOSS OF TASTE: Status: ACTIVE | Noted: 2018-01-25

## 2022-03-19 PROBLEM — R10.11 RUQ ABDOMINAL PAIN: Status: ACTIVE | Noted: 2018-12-04

## 2022-03-19 PROBLEM — R22.1 MASS OF NECK: Status: ACTIVE | Noted: 2020-08-13

## 2022-07-19 ENCOUNTER — TRANSCRIBE ORDER (OUTPATIENT)
Dept: SCHEDULING | Age: 77
End: 2022-07-19

## 2022-07-19 DIAGNOSIS — D34 THYROID ADENOMA: Primary | ICD-10-CM

## 2022-07-22 ENCOUNTER — HOSPITAL ENCOUNTER (OUTPATIENT)
Dept: ULTRASOUND IMAGING | Age: 77
Discharge: HOME | End: 2022-07-22
Attending: OTOLARYNGOLOGY
Payer: MEDICARE

## 2022-07-22 DIAGNOSIS — D34 THYROID ADENOMA: ICD-10-CM

## 2022-07-22 PROCEDURE — 76536 US EXAM OF HEAD AND NECK: CPT

## 2023-05-17 ENCOUNTER — HOSPITAL ENCOUNTER (OUTPATIENT)
Facility: HOSPITAL | Age: 78
Discharge: HOME | End: 2023-05-20
Payer: MEDICARE

## 2023-05-17 DIAGNOSIS — D34 THYROID ADENOMA: ICD-10-CM

## 2023-05-17 PROCEDURE — 76536 US EXAM OF HEAD AND NECK: CPT

## 2024-07-22 ENCOUNTER — HOSPITAL ENCOUNTER (OUTPATIENT)
Facility: HOSPITAL | Age: 79
Discharge: HOME | End: 2024-07-25
Attending: OTOLARYNGOLOGY
Payer: MEDICARE

## 2024-07-22 DIAGNOSIS — D34 THYROID ADENOMA: ICD-10-CM

## 2024-07-22 PROCEDURE — 76536 US EXAM OF HEAD AND NECK: CPT
